# Patient Record
Sex: MALE | Race: BLACK OR AFRICAN AMERICAN | NOT HISPANIC OR LATINO | Employment: FULL TIME | URBAN - METROPOLITAN AREA
[De-identification: names, ages, dates, MRNs, and addresses within clinical notes are randomized per-mention and may not be internally consistent; named-entity substitution may affect disease eponyms.]

---

## 2023-07-24 LAB
ALANINE AMINOTRANSFERASE (SGPT) (U/L) IN SER/PLAS: 7 U/L (ref 10–52)
ALBUMIN (G/DL) IN SER/PLAS: 4.3 G/DL (ref 3.4–5)
ALKALINE PHOSPHATASE (U/L) IN SER/PLAS: 60 U/L (ref 33–120)
ANION GAP IN SER/PLAS: 13 MMOL/L (ref 10–20)
ASPARTATE AMINOTRANSFERASE (SGOT) (U/L) IN SER/PLAS: 10 U/L (ref 9–39)
BASOPHILS (10*3/UL) IN BLOOD BY AUTOMATED COUNT: 0.07 X10E9/L (ref 0–0.1)
BASOPHILS/100 LEUKOCYTES IN BLOOD BY AUTOMATED COUNT: 1.1 % (ref 0–2)
BILIRUBIN TOTAL (MG/DL) IN SER/PLAS: 0.3 MG/DL (ref 0–1.2)
CALCIUM (MG/DL) IN SER/PLAS: 9.2 MG/DL (ref 8.6–10.6)
CARBON DIOXIDE, TOTAL (MMOL/L) IN SER/PLAS: 25 MMOL/L (ref 21–32)
CHLORIDE (MMOL/L) IN SER/PLAS: 105 MMOL/L (ref 98–107)
CREATININE (MG/DL) IN SER/PLAS: 1.25 MG/DL (ref 0.5–1.3)
EOSINOPHILS (10*3/UL) IN BLOOD BY AUTOMATED COUNT: 0.24 X10E9/L (ref 0–0.7)
EOSINOPHILS/100 LEUKOCYTES IN BLOOD BY AUTOMATED COUNT: 3.6 % (ref 0–6)
ERYTHROCYTE DISTRIBUTION WIDTH (RATIO) BY AUTOMATED COUNT: 14.5 % (ref 11.5–14.5)
ERYTHROCYTE MEAN CORPUSCULAR HEMOGLOBIN CONCENTRATION (G/DL) BY AUTOMATED: 32.9 G/DL (ref 32–36)
ERYTHROCYTE MEAN CORPUSCULAR VOLUME (FL) BY AUTOMATED COUNT: 97 FL (ref 80–100)
ERYTHROCYTES (10*6/UL) IN BLOOD BY AUTOMATED COUNT: 4.34 X10E12/L (ref 4.5–5.9)
GFR MALE: 74 ML/MIN/1.73M2
GLUCOSE (MG/DL) IN SER/PLAS: 117 MG/DL (ref 74–99)
HEMATOCRIT (%) IN BLOOD BY AUTOMATED COUNT: 41.9 % (ref 41–52)
HEMOGLOBIN (G/DL) IN BLOOD: 13.8 G/DL (ref 13.5–17.5)
IMMATURE GRANULOCYTES/100 LEUKOCYTES IN BLOOD BY AUTOMATED COUNT: 0.6 % (ref 0–0.9)
LEUKOCYTES (10*3/UL) IN BLOOD BY AUTOMATED COUNT: 6.6 X10E9/L (ref 4.4–11.3)
LYMPHOCYTES (10*3/UL) IN BLOOD BY AUTOMATED COUNT: 2 X10E9/L (ref 1.2–4.8)
LYMPHOCYTES/100 LEUKOCYTES IN BLOOD BY AUTOMATED COUNT: 30.2 % (ref 13–44)
MONOCYTES (10*3/UL) IN BLOOD BY AUTOMATED COUNT: 0.47 X10E9/L (ref 0.1–1)
MONOCYTES/100 LEUKOCYTES IN BLOOD BY AUTOMATED COUNT: 7.1 % (ref 2–10)
NEUTROPHILS (10*3/UL) IN BLOOD BY AUTOMATED COUNT: 3.8 X10E9/L (ref 1.2–7.7)
NEUTROPHILS/100 LEUKOCYTES IN BLOOD BY AUTOMATED COUNT: 57.4 % (ref 40–80)
NRBC (PER 100 WBCS) BY AUTOMATED COUNT: 0 /100 WBC (ref 0–0)
PLATELETS (10*3/UL) IN BLOOD AUTOMATED COUNT: 230 X10E9/L (ref 150–450)
POTASSIUM (MMOL/L) IN SER/PLAS: 3.5 MMOL/L (ref 3.5–5.3)
PROTEIN TOTAL: 7.2 G/DL (ref 6.4–8.2)
SODIUM (MMOL/L) IN SER/PLAS: 139 MMOL/L (ref 136–145)
UREA NITROGEN (MG/DL) IN SER/PLAS: 11 MG/DL (ref 6–23)

## 2023-07-25 LAB
CD3+CD4+ ABSOLUTE: 0.58 X10E9/L (ref 0.35–2.74)
CD3+CD8+ ABSOLUTE: 0.88 X10E9/L (ref 0.08–1.49)
CD4/CD8 RATIO: 0.66 (ref 1–3.5)
CD45%: 100 %
CHLAMYDIA TRACH., AMPLIFIED: NEGATIVE
CP CD3+CD4+%: 29 % (ref 29–57)
CP CD3+CD8+%: 44 % (ref 7–31)
FMETH: ABNORMAL
FSIT1: ABNORMAL
HIV-1 RNA PCR VIRAL LOAD LOG: ABNORMAL LOG10 CPY/ML
HIV-1 RNA VIRAL LOAD: ABNORMAL COPIES/ML
N. GONORRHEA, AMPLIFIED: NEGATIVE
RPR MONITORING: REACTIVE
RPR TITER MONITORING: ABNORMAL

## 2023-11-14 ENCOUNTER — OFFICE VISIT (OUTPATIENT)
Dept: IMMUNOLOGY | Facility: CLINIC | Age: 43
End: 2023-11-14
Payer: COMMERCIAL

## 2023-11-14 VITALS
HEART RATE: 87 BPM | DIASTOLIC BLOOD PRESSURE: 74 MMHG | HEIGHT: 72 IN | TEMPERATURE: 98 F | OXYGEN SATURATION: 100 % | SYSTOLIC BLOOD PRESSURE: 112 MMHG | BODY MASS INDEX: 30.37 KG/M2 | WEIGHT: 224.2 LBS | RESPIRATION RATE: 16 BRPM

## 2023-11-14 DIAGNOSIS — R52 PAIN: ICD-10-CM

## 2023-11-14 DIAGNOSIS — B20 HIV INFECTION, UNSPECIFIED SYMPTOM STATUS (MULTI): ICD-10-CM

## 2023-11-14 LAB
ALBUMIN SERPL BCP-MCNC: 4.1 G/DL (ref 3.4–5)
ALP SERPL-CCNC: 82 U/L (ref 33–120)
ALT SERPL W P-5'-P-CCNC: 15 U/L (ref 10–52)
ANION GAP SERPL CALC-SCNC: 10 MMOL/L (ref 10–20)
AST SERPL W P-5'-P-CCNC: 12 U/L (ref 9–39)
BASOPHILS # BLD AUTO: 0.09 X10*3/UL (ref 0–0.1)
BASOPHILS NFR BLD AUTO: 1.5 %
BILIRUB SERPL-MCNC: 0.3 MG/DL (ref 0–1.2)
BUN SERPL-MCNC: 10 MG/DL (ref 6–23)
CALCIUM SERPL-MCNC: 9.3 MG/DL (ref 8.6–10.6)
CD3+CD4+ CELLS # BLD: 0.59 X10E9/L
CD3+CD4+ CELLS NFR BLD: 30 %
CD3+CD4+ CELLS/CD3+CD8+ CLL BLD: 0.64 %
CD3+CD8+ CELLS # BLD: 0.92 X10E9/L
CD3+CD8+ CELLS NFR BLD: 47 %
CHLORIDE SERPL-SCNC: 103 MMOL/L (ref 98–107)
CO2 SERPL-SCNC: 29 MMOL/L (ref 21–32)
CREAT SERPL-MCNC: 1.26 MG/DL (ref 0.5–1.3)
EOSINOPHIL # BLD AUTO: 0.28 X10*3/UL (ref 0–0.7)
EOSINOPHIL NFR BLD AUTO: 4.5 %
ERYTHROCYTE [DISTWIDTH] IN BLOOD BY AUTOMATED COUNT: 14.4 % (ref 11.5–14.5)
GFR SERPL CREATININE-BSD FRML MDRD: 73 ML/MIN/1.73M*2
GLUCOSE SERPL-MCNC: 91 MG/DL (ref 74–99)
HCT VFR BLD AUTO: 43.2 % (ref 41–52)
HGB BLD-MCNC: 14.2 G/DL (ref 13.5–17.5)
IMM GRANULOCYTES # BLD AUTO: 0.05 X10*3/UL (ref 0–0.7)
IMM GRANULOCYTES NFR BLD AUTO: 0.8 % (ref 0–0.9)
LYMPHOCYTES # BLD AUTO: 1.96 X10*3/UL (ref 1.2–4.8)
LYMPHOCYTES # SPEC AUTO: 1.96 X10*3/UL
LYMPHOCYTES NFR BLD AUTO: 31.6 %
MCH RBC QN AUTO: 31.2 PG (ref 26–34)
MCHC RBC AUTO-ENTMCNC: 32.9 G/DL (ref 32–36)
MCV RBC AUTO: 95 FL (ref 80–100)
MONOCYTES # BLD AUTO: 0.58 X10*3/UL (ref 0.1–1)
MONOCYTES NFR BLD AUTO: 9.4 %
NEUTROPHILS # BLD AUTO: 3.24 X10*3/UL (ref 1.2–7.7)
NEUTROPHILS NFR BLD AUTO: 52.2 %
NRBC BLD-RTO: 0 /100 WBCS (ref 0–0)
PLATELET # BLD AUTO: 259 X10*3/UL (ref 150–450)
POTASSIUM SERPL-SCNC: 4.2 MMOL/L (ref 3.5–5.3)
PROT SERPL-MCNC: 7.6 G/DL (ref 6.4–8.2)
RBC # BLD AUTO: 4.55 X10*6/UL (ref 4.5–5.9)
SODIUM SERPL-SCNC: 138 MMOL/L (ref 136–145)
WBC # BLD AUTO: 6.2 X10*3/UL (ref 4.4–11.3)

## 2023-11-14 PROCEDURE — 87536 HIV-1 QUANT&REVRSE TRNSCRPJ: CPT | Performed by: NURSE PRACTITIONER

## 2023-11-14 PROCEDURE — 36415 COLL VENOUS BLD VENIPUNCTURE: CPT | Performed by: NURSE PRACTITIONER

## 2023-11-14 PROCEDURE — 99214 OFFICE O/P EST MOD 30 MIN: CPT | Performed by: NURSE PRACTITIONER

## 2023-11-14 PROCEDURE — 80053 COMPREHEN METABOLIC PANEL: CPT | Performed by: NURSE PRACTITIONER

## 2023-11-14 PROCEDURE — 88184 FLOWCYTOMETRY/ TC 1 MARKER: CPT | Mod: TC | Performed by: NURSE PRACTITIONER

## 2023-11-14 PROCEDURE — 85025 COMPLETE CBC W/AUTO DIFF WBC: CPT | Performed by: NURSE PRACTITIONER

## 2023-11-14 RX ORDER — DEXTROMETHORPHAN HYDROBROMIDE, GUAIFENESIN 5; 100 MG/5ML; MG/5ML
650 LIQUID ORAL EVERY 8 HOURS PRN
Qty: 90 TABLET | Refills: 0 | Status: CANCELLED | OUTPATIENT
Start: 2023-11-14 | End: 2023-12-14

## 2023-11-14 RX ORDER — DEXTROMETHORPHAN HYDROBROMIDE, GUAIFENESIN 5; 100 MG/5ML; MG/5ML
650 LIQUID ORAL EVERY 8 HOURS PRN
Qty: 90 TABLET | Refills: 0 | Status: SHIPPED | OUTPATIENT
Start: 2023-11-14 | End: 2023-12-14

## 2023-11-14 ASSESSMENT — PAIN SCALES - GENERAL: PAINLEVEL: 0-NO PAIN

## 2023-11-14 NOTE — PROGRESS NOTES
HIV Clinic Follow-up Visit:    Greg Dia was last seen in [facility] on 7/24/23    Missed antiretroviral doses in last 72 hours? No    SUBJECTIVE: HIV positive gentleman in for routine follow up..  Needs to follow up with colorectal.  An episode of R UQ discomfort.    Review of Systems  Review of Systems   Constitutional: Negative.    HENT: Negative.     Eyes: Negative.    Respiratory:          See under Cardio -    Cardiovascular:         About a week and a half ago he had some unpleasant pain in the RUQ of his chest that lasted for about an hour and a half.  Then it was gone.  This has happened once before.  We discussed reasons to go to ER, etc.  We will do watchful waiting at this time and do an  ultrasound if it happens again.    Gastrointestinal:         He has been seen by colorectal and urology for condyloma - will follow up with them re:  scheduling the procedure(s).   Endocrine: Negative.    Genitourinary: Negative.         Being see by urology and colorectal re: condylomas.   Musculoskeletal: Negative.    Allergic/Immunologic: Negative.    Neurological: Negative.    Hematological: Negative.    Psychiatric/Behavioral: Negative.         CURRENT MEDICATIONS:    Current Outpatient Medications:     acetaminophen (Tylenol 8 Hour) 650 mg ER tablet, Take 1 tablet (650 mg) by mouth every 8 hours if needed for mild pain (1 - 3). Do not crush, chew, or split., Disp: 90 tablet, Rfl: 0    amLODIPine (Norvasc) 10 mg tablet, TAKE ONE (1) TABLET BY MOUTH ONCE DAILY, Disp: 30 tablet, Rfl: 5    ouiexxuox-zfqdmhcl-dilhkjs ala (Biktarvy) -25 mg tablet, TAKE 1 TABLET DAILY, Disp: 30 tablet, Rfl: 5    lisinopril 40 mg tablet, TAKE 1 TABLET BY MOUTH DAILY., Disp: 30 tablet, Rfl: 5    PHYSICAL EXAMINATION:  Visit Vitals  /74 (BP Location: Right arm, Patient Position: Sitting, BP Cuff Size: Large adult)   Pulse 87   Temp 36.7 °C (98 °F) (Temporal)   Resp 16   Ht 1.829 m (6')   Wt 102 kg (224 lb 3.2 oz)   SpO2 100%   BMI  30.41 kg/m²   Smoking Status Every Day   BSA 2.28 m²       Physical Exam   Physical Exam  Vitals reviewed.   Constitutional:       Appearance: Normal appearance.   HENT:      Head: Normocephalic.   Cardiovascular:      Rate and Rhythm: Normal rate and regular rhythm.      Heart sounds: Normal heart sounds.   Pulmonary:      Effort: Pulmonary effort is normal.      Breath sounds: Normal breath sounds.   Abdominal:      Palpations: Abdomen is soft.   Genitourinary:     Comments: He will follow up with colorectal/urology re: procedure.  I will also send a note.   Musculoskeletal:         General: Normal range of motion.      Cervical back: Normal range of motion and neck supple.   Neurological:      Mental Status: He is alert and oriented to person, place, and time.   Psychiatric:         Mood and Affect: Mood normal.         PERTINENT DATA:  CBC:  WBC   Date Value Ref Range Status   11/14/2023 6.2 4.4 - 11.3 x10*3/uL Final     nRBC   Date Value Ref Range Status   11/14/2023 0.0 0.0 - 0.0 /100 WBCs Final     RBC   Date Value Ref Range Status   11/14/2023 4.55 4.50 - 5.90 x10*6/uL Final     Hemoglobin   Date Value Ref Range Status   11/14/2023 14.2 13.5 - 17.5 g/dL Final     MCV   Date Value Ref Range Status   11/14/2023 95 80 - 100 fL Final     RDW   Date Value Ref Range Status   11/14/2023 14.4 11.5 - 14.5 % Final       Renal Function Panel:  Glucose   Date Value Ref Range Status   11/14/2023 91 74 - 99 mg/dL Final     Sodium   Date Value Ref Range Status   11/14/2023 138 136 - 145 mmol/L Final     Potassium   Date Value Ref Range Status   11/14/2023 4.2 3.5 - 5.3 mmol/L Final     Chloride   Date Value Ref Range Status   11/14/2023 103 98 - 107 mmol/L Final     Anion Gap   Date Value Ref Range Status   11/14/2023 10 10 - 20 mmol/L Final     Urea Nitrogen   Date Value Ref Range Status   11/14/2023 10 6 - 23 mg/dL Final     Creatinine   Date Value Ref Range Status   11/14/2023 1.26 0.50 - 1.30 mg/dL Final     eGFR  "  Date Value Ref Range Status   11/14/2023 73 >60 mL/min/1.73m*2 Final     Comment:     Calculations of estimated GFR are performed using the 2021 CKD-EPI Study Refit equation without the race variable for the IDMS-Traceable creatinine methods.  https://jasn.asnjournals.org/content/early/2021/09/22/ASN.4953242289     Calcium   Date Value Ref Range Status   11/14/2023 9.3 8.6 - 10.6 mg/dL Final     Phosphorus   Date Value Ref Range Status   01/24/2023 5.2 (H) 2.5 - 4.9 mg/dL Final     Comment:      The performance characteristics of phosphorus testing in   heparinized plasma have been validated by the individual     laboratory site where testing is performed. Testing    on heparinized plasma is not approved by the FDA;    however, such approval is not necessary.       Albumin   Date Value Ref Range Status   11/14/2023 4.1 3.4 - 5.0 g/dL Final       Hepatic Panel:  Albumin   Date Value Ref Range Status   11/14/2023 4.1 3.4 - 5.0 g/dL Final     Bilirubin, Total   Date Value Ref Range Status   11/14/2023 0.3 0.0 - 1.2 mg/dL Final     Bilirubin, Direct   Date Value Ref Range Status   01/24/2023 0.1 0.0 - 0.3 mg/dL Final     Alkaline Phosphatase   Date Value Ref Range Status   11/14/2023 82 33 - 120 U/L Final     ALT   Date Value Ref Range Status   11/14/2023 15 10 - 52 U/L Final     Comment:     Patients treated with Sulfasalazine may generate falsely decreased results for ALT.       HIV Viral Load:  No results found for: \"[id number]\", \"HIVRNAPCR\"    CD4 Count:  CD3+CD4+%   Date Value Ref Range Status   07/24/2023 29 29 - 57 % Final     CD3+CD4+ Absolute   Date Value Ref Range Status   07/24/2023 0.580 0.350 - 2.740 x10E9/L Final     CD3+CD8+%   Date Value Ref Range Status   07/24/2023 44 (H) 7 - 31 % Final     CD3+CD8+ Absolute   Date Value Ref Range Status   07/24/2023 0.880 0.080 - 1.490 x10E9/L Final     CD4/CD8 Ratio   Date Value Ref Range Status   07/24/2023 0.66 (L) 1.00 - 3.50 Final     CD45%   Date Value Ref " Range Status   07/24/2023 100 % Final       CRCL:  Creatinine, Urine   Date Value Ref Range Status   12/13/2022 180.0 20.0 - 370.0 mg/dL Final       Lipid Panel:  HDL   Date Value Ref Range Status   01/24/2023 34.9 (A) mg/dL Final     Comment:     .      AGE      VERY LOW   LOW     NORMAL    HIGH       0-19 Y       < 35   < 40     40-45     ----    20-24 Y       ----   < 40       >45     ----      >24 Y       ----   < 40     40-60      >60  .       Cholesterol/HDL Ratio   Date Value Ref Range Status   01/24/2023 4.3  Final     Comment:     REF VALUES  DESIRABLE  < 3.4  HIGH RISK  > 5.0       LDL   Date Value Ref Range Status   01/24/2023 94 0 - 99 mg/dL Final     Comment:     .                           NEAR      BORD      AGE      DESIRABLE  OPTIMAL    HIGH     HIGH     VERY HIGH     0-19 Y     0 - 109     ---    110-129   >/= 130     ----    20-24 Y     0 - 119     ---    120-159   >/= 160     ----      >24 Y     0 -  99   100-129  130-159   160-189     >/=190  .       VLDL   Date Value Ref Range Status   01/24/2023 20 0 - 40 mg/dL Final     Triglycerides   Date Value Ref Range Status   01/24/2023 99 0 - 149 mg/dL Final     Comment:     .      AGE      DESIRABLE   BORDERLINE HIGH   HIGH     VERY HIGH   0 D-90 D    19 - 174         ----         ----        ----  91 D- 9 Y     0 -  74        75 -  99     >/= 100      ----    10-19 Y     0 -  89        90 - 129     >/= 130      ----    20-24 Y     0 - 114       115 - 149     >/= 150      ----         >24 Y     0 - 149       150 - 199    200- 499    >/= 500  .   Venipuncture immediately after or during the    administration of Metamizole may lead to falsely   low results. Testing should be performed immediately   prior to Metamizole dosing.     01/24/2023 99 0 - 149 mg/dL Final     Comment:     .      AGE      DESIRABLE   BORDERLINE HIGH   HIGH     VERY HIGH   0 D-90 D    19 - 174         ----         ----        ----  91 D- 9 Y     0 -  74        75 -  99     >/= 100  "     ----    10-19 Y     0 -  89        90 - 129     >/= 130      ----    20-24 Y     0 - 114       115 - 149     >/= 150      ----         >24 Y     0 - 149       150 - 199    200- 499    >/= 500  .   Venipuncture immediately after or during the    administration of Metamizole may lead to falsely   low results. Testing should be performed immediately   prior to Metamizole dosing.         HgbA1c:  No results found for: \"HGBA1C\"    The 10-year ASCVD risk score (Denton AUGUSTINE, et al., 2019) is: 7.4%    Values used to calculate the score:      Age: 43 years      Sex: Male      Is Non- : Yes      Diabetic: No      Tobacco smoker: Yes      Systolic Blood Pressure: 112 mmHg      Is BP treated: Yes      HDL Cholesterol: 34.9 mg/dL      Total Cholesterol: 149 mg/dL    ASSESSMENT / PLAN:  Will get routine labs today; and follow up in 6 months.  Sooner if needed.  Will also reach out to colorectal on his behalf.  Also see by SW.    Problem List Items Addressed This Visit    None  Visit Diagnoses       Pain        Relevant Medications    acetaminophen (Tylenol 8 Hour) 650 mg ER tablet    HIV infection, unspecified symptom status (CMS/HCC)        Relevant Orders    CBC and Auto Differential (Completed)    CD4/8 Panel    Comprehensive Metabolic Panel (Completed)    HIV RNA, quantitative, PCR        Thank you for coming in to see us today.,   We are getting routine labs; and plan to see you again in 6 months.  We discussed that with [facility] s.r.lshubham you can contact any of  your providers directly.   I will also reach out for you regarding the colorectal procedure.    Anne Wong, APRN-CNP      "

## 2023-11-14 NOTE — LETTER
11/15/23    Portillo Villatoro MD  Office Address Unavailable  Office Address Unavailable  As Of 09/30/2020      Dear Dr. Portillo Villatoro MD,    I am writing to confirm that your patient, Javi Dia, received care in my office on 11/15/23. I have enclosed a summary of the care provided to Javi for your reference.    Please contact me with any questions you may have regarding the visit.    Sincerely,         Anne Wong, APRN-CNP  [address]  [facility] [place] [address]    CC: No Recipients

## 2023-11-14 NOTE — PROGRESS NOTES
PT lost Medicaid because he was over income. He has tried multiple times to reach to Medicaid to get a denial letter but has not been able to get through. Pt needs to provide a denial letter to employer to get employee based insurance during a non open enrollment time.  SW asked pt to find out when his open enrollment at work is and th will let SW know. Discussed that if it is in the spring we can obtain HERNAN coverage until then.   Chiquita Cox, [facility], [place]

## 2023-11-15 LAB
HIV1 RNA # PLAS NAA DL=20: NOT DETECTED {COPIES}/ML
HIV1 RNA SPEC NAA+PROBE-LOG#: NORMAL {LOG_COPIES}/ML

## 2023-11-15 ASSESSMENT — ENCOUNTER SYMPTOMS
ENDOCRINE NEGATIVE: 1
MUSCULOSKELETAL NEGATIVE: 1
HEMATOLOGIC/LYMPHATIC NEGATIVE: 1
EYES NEGATIVE: 1
CONSTITUTIONAL NEGATIVE: 1
ALLERGIC/IMMUNOLOGIC NEGATIVE: 1
PSYCHIATRIC NEGATIVE: 1
NEUROLOGICAL NEGATIVE: 1

## 2023-11-16 ENCOUNTER — TELEPHONE (OUTPATIENT)
Dept: IMMUNOLOGY | Facility: CLINIC | Age: 43
End: 2023-11-16
Payer: COMMERCIAL

## 2023-11-16 NOTE — PROGRESS NOTES
SW spoke to pt and his open enrollment at work ends next week and he will enroll in a plan today. Pt and SW discussed ANDREW and he will talk to his HR rep about this and SW let him know that they can call SW as well. PT is aware that check will have [place] [employer] on it.. PT will update JEZ Cox, JOHN AGUILA

## 2024-01-08 DIAGNOSIS — I67.1 CEREBRAL ANEURYSM (HHS-HCC): Primary | ICD-10-CM

## 2024-02-16 ENCOUNTER — TELEPHONE (OUTPATIENT)
Dept: IMMUNOLOGY | Facility: CLINIC | Age: 44
End: 2024-02-16
Payer: COMMERCIAL

## 2024-02-16 ENCOUNTER — DOCUMENTATION (OUTPATIENT)
Dept: IMMUNOLOGY | Facility: CLINIC | Age: 44
End: 2024-02-16
Payer: COMMERCIAL

## 2024-02-16 DIAGNOSIS — I10 HYPERTENSION, UNSPECIFIED TYPE: ICD-10-CM

## 2024-02-16 DIAGNOSIS — B20 HUMAN IMMUNODEFICIENCY VIRUS (MULTI): Primary | ICD-10-CM

## 2024-02-16 RX ORDER — AMLODIPINE BESYLATE 10 MG/1
10 TABLET ORAL DAILY
Qty: 30 TABLET | Refills: 3 | Status: SHIPPED | OUTPATIENT
Start: 2024-02-16 | End: 2024-03-17

## 2024-02-16 RX ORDER — LISINOPRIL 40 MG/1
40 TABLET ORAL DAILY
Qty: 30 TABLET | Refills: 3 | Status: SHIPPED | OUTPATIENT
Start: 2024-02-16 | End: 2024-03-17

## 2024-02-16 NOTE — TELEPHONE ENCOUNTER
Received call from pt, this sw covering for regular sw.  Pt not able to order meds.  [facility]  Tia worked with pharm do to [facility].  Confirmed it went through with no copay.  Sw notified pt.  Pt has no other questions for sw at this time.

## 2024-02-19 ENCOUNTER — DOCUMENTATION (OUTPATIENT)
Dept: IMMUNOLOGY | Facility: CLINIC | Age: 44
End: 2024-02-19
Payer: COMMERCIAL

## 2024-04-15 ENCOUNTER — TELEPHONE (OUTPATIENT)
Dept: IMMUNOLOGY | Facility: CLINIC | Age: 44
End: 2024-04-15
Payer: COMMERCIAL

## 2024-04-15 NOTE — TELEPHONE ENCOUNTER
Pt missed today's appointment. SW called pt and left message for him to call back to JOHN Whiting

## 2024-04-22 ENCOUNTER — OFFICE VISIT (OUTPATIENT)
Dept: IMMUNOLOGY | Facility: CLINIC | Age: 44
End: 2024-04-22
Payer: COMMERCIAL

## 2024-04-22 ENCOUNTER — DOCUMENTATION (OUTPATIENT)
Dept: IMMUNOLOGY | Facility: CLINIC | Age: 44
End: 2024-04-22
Payer: COMMERCIAL

## 2024-04-22 VITALS
RESPIRATION RATE: 16 BRPM | HEART RATE: 77 BPM | TEMPERATURE: 98.1 F | OXYGEN SATURATION: 99 % | BODY MASS INDEX: 29.42 KG/M2 | SYSTOLIC BLOOD PRESSURE: 112 MMHG | HEIGHT: 73 IN | DIASTOLIC BLOOD PRESSURE: 75 MMHG | WEIGHT: 222 LBS

## 2024-04-22 DIAGNOSIS — Z59.41 FOOD INSECURITY: ICD-10-CM

## 2024-04-22 DIAGNOSIS — B20 HIV INFECTION, UNSPECIFIED SYMPTOM STATUS (MULTI): ICD-10-CM

## 2024-04-22 DIAGNOSIS — M19.90 ARTHRITIS PAIN: Primary | ICD-10-CM

## 2024-04-22 DIAGNOSIS — M19.90 ARTHRITIS PAIN: ICD-10-CM

## 2024-04-22 LAB
ALBUMIN SERPL BCP-MCNC: 4.2 G/DL (ref 3.4–5)
ALP SERPL-CCNC: 57 U/L (ref 33–120)
ALT SERPL W P-5'-P-CCNC: 7 U/L (ref 10–52)
ANION GAP SERPL CALC-SCNC: 13 MMOL/L (ref 10–20)
AST SERPL W P-5'-P-CCNC: 11 U/L (ref 9–39)
BASOPHILS # BLD AUTO: 0.05 X10*3/UL (ref 0–0.1)
BASOPHILS NFR BLD AUTO: 0.9 %
BILIRUB SERPL-MCNC: 0.3 MG/DL (ref 0–1.2)
BUN SERPL-MCNC: 11 MG/DL (ref 6–23)
CALCIUM SERPL-MCNC: 9.5 MG/DL (ref 8.6–10.6)
CHLORIDE SERPL-SCNC: 101 MMOL/L (ref 98–107)
CO2 SERPL-SCNC: 27 MMOL/L (ref 21–32)
CREAT SERPL-MCNC: 1.5 MG/DL (ref 0.5–1.3)
EGFRCR SERPLBLD CKD-EPI 2021: 59 ML/MIN/1.73M*2
EOSINOPHIL # BLD AUTO: 0.22 X10*3/UL (ref 0–0.7)
EOSINOPHIL NFR BLD AUTO: 4.1 %
ERYTHROCYTE [DISTWIDTH] IN BLOOD BY AUTOMATED COUNT: 13.4 % (ref 11.5–14.5)
GLUCOSE SERPL-MCNC: 65 MG/DL (ref 74–99)
HCT VFR BLD AUTO: 44.6 % (ref 41–52)
HGB BLD-MCNC: 14.6 G/DL (ref 13.5–17.5)
IMM GRANULOCYTES # BLD AUTO: 0.01 X10*3/UL (ref 0–0.7)
IMM GRANULOCYTES NFR BLD AUTO: 0.2 % (ref 0–0.9)
LYMPHOCYTES # BLD AUTO: 1.77 X10*3/UL (ref 1.2–4.8)
LYMPHOCYTES NFR BLD AUTO: 33.1 %
MCH RBC QN AUTO: 31.3 PG (ref 26–34)
MCHC RBC AUTO-ENTMCNC: 32.7 G/DL (ref 32–36)
MCV RBC AUTO: 96 FL (ref 80–100)
MONOCYTES # BLD AUTO: 0.55 X10*3/UL (ref 0.1–1)
MONOCYTES NFR BLD AUTO: 10.3 %
NEUTROPHILS # BLD AUTO: 2.75 X10*3/UL (ref 1.2–7.7)
NEUTROPHILS NFR BLD AUTO: 51.4 %
NRBC BLD-RTO: 0 /100 WBCS (ref 0–0)
PLATELET # BLD AUTO: 217 X10*3/UL (ref 150–450)
POTASSIUM SERPL-SCNC: 4.4 MMOL/L (ref 3.5–5.3)
PROT SERPL-MCNC: 7.9 G/DL (ref 6.4–8.2)
RBC # BLD AUTO: 4.66 X10*6/UL (ref 4.5–5.9)
SODIUM SERPL-SCNC: 137 MMOL/L (ref 136–145)
WBC # BLD AUTO: 5.4 X10*3/UL (ref 4.4–11.3)

## 2024-04-22 PROCEDURE — 84075 ASSAY ALKALINE PHOSPHATASE: CPT | Performed by: NURSE PRACTITIONER

## 2024-04-22 PROCEDURE — 87536 HIV-1 QUANT&REVRSE TRNSCRPJ: CPT | Performed by: NURSE PRACTITIONER

## 2024-04-22 PROCEDURE — 99214 OFFICE O/P EST MOD 30 MIN: CPT | Performed by: NURSE PRACTITIONER

## 2024-04-22 PROCEDURE — 85025 COMPLETE CBC W/AUTO DIFF WBC: CPT | Performed by: NURSE PRACTITIONER

## 2024-04-22 PROCEDURE — 36415 COLL VENOUS BLD VENIPUNCTURE: CPT | Performed by: NURSE PRACTITIONER

## 2024-04-22 RX ORDER — BICTEGRAVIR SODIUM, EMTRICITABINE, AND TENOFOVIR ALAFENAMIDE FUMARATE 50; 200; 25 MG/1; MG/1; MG/1
1 TABLET ORAL DAILY
COMMUNITY
Start: 2024-03-21 | End: 2024-04-22 | Stop reason: SDUPTHER

## 2024-04-22 RX ORDER — BICTEGRAVIR SODIUM, EMTRICITABINE, AND TENOFOVIR ALAFENAMIDE FUMARATE 50; 200; 25 MG/1; MG/1; MG/1
1 TABLET ORAL DAILY
Qty: 30 TABLET | Refills: 5 | Status: SHIPPED | OUTPATIENT
Start: 2024-04-22

## 2024-04-22 RX ORDER — DEXTROMETHORPHAN HYDROBROMIDE, GUAIFENESIN 5; 100 MG/5ML; MG/5ML
650 LIQUID ORAL EVERY 8 HOURS PRN
Qty: 90 TABLET | Refills: 1 | OUTPATIENT
Start: 2024-04-22 | End: 2024-05-22

## 2024-04-22 RX ORDER — DEXTROMETHORPHAN HYDROBROMIDE, GUAIFENESIN 5; 100 MG/5ML; MG/5ML
650 LIQUID ORAL EVERY 8 HOURS PRN
Qty: 90 TABLET | Refills: 1 | Status: SHIPPED | OUTPATIENT
Start: 2024-04-22 | End: 2024-05-22

## 2024-04-22 SDOH — ECONOMIC STABILITY - FOOD INSECURITY: FOOD INSECURITY: Z59.41

## 2024-04-22 ASSESSMENT — ENCOUNTER SYMPTOMS
CARDIOVASCULAR NEGATIVE: 1
HEMATOLOGIC/LYMPHATIC NEGATIVE: 1
NEUROLOGICAL NEGATIVE: 1
ALLERGIC/IMMUNOLOGIC NEGATIVE: 1
PSYCHIATRIC NEGATIVE: 1
GASTROINTESTINAL NEGATIVE: 1
RESPIRATORY NEGATIVE: 1
EYES NEGATIVE: 1
CONSTITUTIONAL NEGATIVE: 1

## 2024-04-22 ASSESSMENT — PAIN SCALES - GENERAL: PAINLEVEL: 0-NO PAIN

## 2024-04-22 NOTE — LETTER
04/22/24    Portilol Villatoro MD  Office Address Unavailable  Office Address Unavailable  As Of 09/30/2020      Dear Dr. Portillo Villatoro MD,    I am writing to confirm that your patient, Javi Dia, received care in my office on 04/22/24. I have enclosed a summary of the care provided to Javi for your reference.    Please contact me with any questions you may have regarding the visit.    Sincerely,         Anne Wong, APRN-CNP  [address]  [facility] [address] [phone]    CC: No Recipients

## 2024-04-22 NOTE — PROGRESS NOTES
"HIV Clinic Follow-up Visit:    Greg Dia was last seen in [facility] on 4/15/2024    Missed antiretroviral doses in last 72 hours? Yes   Has missed one dose in last 3 days.     Sexually active? yes, Partner/s aware of diagnosis? yes,     Condom use? Always,   Tobacco use: Yes  Smokes about 1/2 selena a day.    No alcohol or drug  use.     SUBJECTIVE: HIV positive gentleman in for routine follow up .  No real complaints today.      Review of Systems  Review of Systems   Constitutional: Negative.    HENT: Negative.     Eyes: Negative.    Respiratory: Negative.     Cardiovascular: Negative.    Gastrointestinal: Negative.    Genitourinary: Negative.    Musculoskeletal:         Only age related aches and pains.    Skin: Negative.    Allergic/Immunologic: Negative.    Neurological: Negative.    Hematological: Negative.    Psychiatric/Behavioral: Negative.         CURRENT MEDICATIONS:    Current Outpatient Medications:     Biktarvy -25 mg tablet, Take 1 tablet by mouth once daily., Disp: , Rfl:     acetaminophen (Tylenol Arthritis Pain) 650 mg ER tablet, Take 1 tablet (650 mg) by mouth every 8 hours if needed for mild pain (1 - 3). Do not crush, chew, or split., Disp: 90 tablet, Rfl: 1    amLODIPine (Norvasc) 10 mg tablet, TAKE ONE (1) TABLET BY MOUTH ONCE DAILY, Disp: 30 tablet, Rfl: 3    lisinopril 40 mg tablet, TAKE 1 TABLET BY MOUTH DAILY., Disp: 30 tablet, Rfl: 3    PHYSICAL EXAMINATION:  Visit Vitals  /75 (BP Location: Right arm, Patient Position: Sitting, BP Cuff Size: Adult)   Pulse 77   Temp 36.7 °C (98.1 °F) (Skin)   Resp 16   Ht 1.854 m (6' 1\")   Wt 101 kg (222 lb)   SpO2 99%   BMI 29.29 kg/m²   Smoking Status Every Day   BSA 2.28 m²       Physical Exam   Physical Exam  Vitals reviewed.   Constitutional:       Appearance: Normal appearance.   HENT:      Head: Normocephalic.   Cardiovascular:      Rate and Rhythm: Normal rate and regular rhythm.      Heart sounds: Normal heart sounds.   Pulmonary:      " Effort: Pulmonary effort is normal.      Breath sounds: Normal breath sounds.   Abdominal:      General: Bowel sounds are normal.      Palpations: Abdomen is soft.   Musculoskeletal:         General: Normal range of motion.      Cervical back: Neck supple.   Skin:     General: Skin is warm and dry.   Neurological:      Mental Status: He is alert and oriented to person, place, and time.   Psychiatric:         Mood and Affect: Mood normal.         Behavior: Behavior normal.         PERTINENT DATA:  CBC:  WBC   Date Value Ref Range Status   04/22/2024 5.4 4.4 - 11.3 x10*3/uL Final     nRBC   Date Value Ref Range Status   04/22/2024 0.0 0.0 - 0.0 /100 WBCs Final     RBC   Date Value Ref Range Status   04/22/2024 4.66 4.50 - 5.90 x10*6/uL Final     Hemoglobin   Date Value Ref Range Status   04/22/2024 14.6 13.5 - 17.5 g/dL Final     MCV   Date Value Ref Range Status   04/22/2024 96 80 - 100 fL Final     RDW   Date Value Ref Range Status   04/22/2024 13.4 11.5 - 14.5 % Final       Renal Function Panel:  Glucose   Date Value Ref Range Status   11/14/2023 91 74 - 99 mg/dL Final     Sodium   Date Value Ref Range Status   11/14/2023 138 136 - 145 mmol/L Final     Potassium   Date Value Ref Range Status   11/14/2023 4.2 3.5 - 5.3 mmol/L Final     Chloride   Date Value Ref Range Status   11/14/2023 103 98 - 107 mmol/L Final     Anion Gap   Date Value Ref Range Status   11/14/2023 10 10 - 20 mmol/L Final     Urea Nitrogen   Date Value Ref Range Status   11/14/2023 10 6 - 23 mg/dL Final     Creatinine   Date Value Ref Range Status   11/14/2023 1.26 0.50 - 1.30 mg/dL Final     eGFR   Date Value Ref Range Status   11/14/2023 73 >60 mL/min/1.73m*2 Final     Comment:     Calculations of estimated GFR are performed using the 2021 CKD-EPI Study Refit equation without the race variable for the IDMS-Traceable creatinine methods.  https://jasn.asnjournals.org/content/early/2021/09/22/ASN.6436855626     Calcium   Date Value Ref Range  Status   11/14/2023 9.3 8.6 - 10.6 mg/dL Final     Phosphorus   Date Value Ref Range Status   01/24/2023 5.2 (H) 2.5 - 4.9 mg/dL Final     Comment:      The performance characteristics of phosphorus testing in   heparinized plasma have been validated by the individual     laboratory site where testing is performed. Testing    on heparinized plasma is not approved by the FDA;    however, such approval is not necessary.       Albumin   Date Value Ref Range Status   11/14/2023 4.1 3.4 - 5.0 g/dL Final       Hepatic Panel:  Albumin   Date Value Ref Range Status   11/14/2023 4.1 3.4 - 5.0 g/dL Final     Bilirubin, Total   Date Value Ref Range Status   11/14/2023 0.3 0.0 - 1.2 mg/dL Final     Bilirubin, Direct   Date Value Ref Range Status   01/24/2023 0.1 0.0 - 0.3 mg/dL Final     Alkaline Phosphatase   Date Value Ref Range Status   11/14/2023 82 33 - 120 U/L Final     ALT   Date Value Ref Range Status   11/14/2023 15 10 - 52 U/L Final     Comment:     Patients treated with Sulfasalazine may generate falsely decreased results for ALT.       HIV Viral Load:  HIV-1 RNA PCR Viral Load Log   Date Value Ref Range Status   11/14/2023   Final     Comment:     Not calculated     HIV RNA Result   Date Value Ref Range Status   11/14/2023 Not Detected Not Detected Final       CD4 Count:  CD3+CD4+%   Date Value Ref Range Status   11/14/2023 30 29 - 57 % Final     CD3+CD4+ Absolute   Date Value Ref Range Status   11/14/2023 0.588 0.350 - 2.740 x10E9/L Final     CD3+CD8+%   Date Value Ref Range Status   11/14/2023 47 (H) 7 - 31 % Final     CD3+CD8+ Absolute   Date Value Ref Range Status   11/14/2023 0.921 0.080 - 1.490 x10E9/L Final     CD4/CD8 Ratio   Date Value Ref Range Status   11/14/2023 0.64 (L) 1.00 - 2.60 Final     CD45%   Date Value Ref Range Status   07/24/2023 100 % Final       CRCL:  Creatinine, Urine   Date Value Ref Range Status   12/13/2022 180.0 20.0 - 370.0 mg/dL Final       Lipid Panel:  HDL   Date Value Ref Range  Status   01/24/2023 34.9 (A) mg/dL Final     Comment:     .      AGE      VERY LOW   LOW     NORMAL    HIGH       0-19 Y       < 35   < 40     40-45     ----    20-24 Y       ----   < 40       >45     ----      >24 Y       ----   < 40     40-60      >60  .       Cholesterol/HDL Ratio   Date Value Ref Range Status   01/24/2023 4.3  Final     Comment:     REF VALUES  DESIRABLE  < 3.4  HIGH RISK  > 5.0       LDL   Date Value Ref Range Status   01/24/2023 94 0 - 99 mg/dL Final     Comment:     .                           NEAR      BORD      AGE      DESIRABLE  OPTIMAL    HIGH     HIGH     VERY HIGH     0-19 Y     0 - 109     ---    110-129   >/= 130     ----    20-24 Y     0 - 119     ---    120-159   >/= 160     ----      >24 Y     0 -  99   100-129  130-159   160-189     >/=190  .       VLDL   Date Value Ref Range Status   01/24/2023 20 0 - 40 mg/dL Final     Triglycerides   Date Value Ref Range Status   01/24/2023 99 0 - 149 mg/dL Final     Comment:     .      AGE      DESIRABLE   BORDERLINE HIGH   HIGH     VERY HIGH   0 D-90 D    19 - 174         ----         ----        ----  91 D- 9 Y     0 -  74        75 -  99     >/= 100      ----    10-19 Y     0 -  89        90 - 129     >/= 130      ----    20-24 Y     0 - 114       115 - 149     >/= 150      ----         >24 Y     0 - 149       150 - 199    200- 499    >/= 500  .   Venipuncture immediately after or during the    administration of Metamizole may lead to falsely   low results. Testing should be performed immediately   prior to Metamizole dosing.     01/24/2023 99 0 - 149 mg/dL Final     Comment:     .      AGE      DESIRABLE   BORDERLINE HIGH   HIGH     VERY HIGH   0 D-90 D    19 - 174         ----         ----        ----  91 D- 9 Y     0 -  74        75 -  99     >/= 100      ----    10-19 Y     0 -  89        90 - 129     >/= 130      ----    20-24 Y     0 - 114       115 - 149     >/= 150      ----         >24 Y     0 - 149       150 - 199    200- 499     ">/= 500  .   Venipuncture immediately after or during the    administration of Metamizole may lead to falsely   low results. Testing should be performed immediately   prior to Metamizole dosing.         HgbA1c:  No results found for: \"HGBA1C\"    The 10-year ASCVD risk score (Denton AUGUSTINE, et al., 2019) is: 4.8%    Values used to calculate the score:      Age: 44 years      Sex: Male      Is Non- : Yes      Diabetic: No      Tobacco smoker: Yes      Systolic Blood Pressure: 112 mmHg      Is BP treated: No      HDL Cholesterol: 34.9 mg/dL      Total Cholesterol: 149 mg/dL    ASSESSMENT / PLAN:  Getting routine labs today.  Will see back in 6 months.   Made food for life referral.   Problem List Items Addressed This Visit    None  Visit Diagnoses       Arthritis pain    -  Primary    Relevant Medications    acetaminophen (Tylenol Arthritis Pain) 650 mg ER tablet    HIV infection, unspecified symptom status (Multi)        Relevant Orders    CBC and Auto Differential (Completed)    CD4/8 Panel    Comprehensive Metabolic Panel    HIV RNA, quantitative, PCR        Thank you for coming in to see us today.   You look great.  We will see you back in 6 months.     Anne Wong, APRN-CNP      "

## 2024-06-14 DIAGNOSIS — I10 HYPERTENSION, UNSPECIFIED TYPE: ICD-10-CM

## 2024-06-14 RX ORDER — LISINOPRIL 40 MG/1
40 TABLET ORAL DAILY
Qty: 30 TABLET | Refills: 3 | Status: SHIPPED | OUTPATIENT
Start: 2024-06-14

## 2024-06-14 RX ORDER — AMLODIPINE BESYLATE 10 MG/1
10 TABLET ORAL DAILY
Qty: 30 TABLET | Refills: 3 | Status: SHIPPED | OUTPATIENT
Start: 2024-06-14

## 2024-07-17 ENCOUNTER — DOCUMENTATION (OUTPATIENT)
Dept: IMMUNOLOGY | Facility: CLINIC | Age: 44
End: 2024-07-17
Payer: COMMERCIAL

## 2024-07-17 NOTE — PROGRESS NOTES
Pt dropped off pay stubs for OHDAP application. SW completed and submitted OHDAP application. SW will update when approved.   JOHN Phipps     PT approved for OHDAP. SW called pt and he will let SW know if there are any issues filling meds.   JOHN Phipps

## 2024-07-22 DIAGNOSIS — I10 HYPERTENSION, UNSPECIFIED TYPE: ICD-10-CM

## 2024-07-22 RX ORDER — AMLODIPINE BESYLATE 10 MG/1
10 TABLET ORAL DAILY
Qty: 90 TABLET | Refills: 1 | OUTPATIENT
Start: 2024-07-22

## 2024-07-22 RX ORDER — LISINOPRIL 40 MG/1
40 TABLET ORAL DAILY
Qty: 90 TABLET | Refills: 1 | OUTPATIENT
Start: 2024-07-22

## 2024-07-22 NOTE — TELEPHONE ENCOUNTER
Pharmacy sent [facility]ill [facility] for [facility]day [facility] Will [facility], patient already has scripts for 30-day supply w/refills.

## 2024-08-31 DIAGNOSIS — I10 HYPERTENSION, UNSPECIFIED TYPE: ICD-10-CM

## 2024-09-03 RX ORDER — LISINOPRIL 40 MG/1
40 TABLET ORAL DAILY
Qty: 90 TABLET | Refills: 1 | Status: SHIPPED | OUTPATIENT
Start: 2024-09-03

## 2024-09-03 RX ORDER — AMLODIPINE BESYLATE 10 MG/1
10 TABLET ORAL DAILY
Qty: 90 TABLET | Refills: 1 | Status: SHIPPED | OUTPATIENT
Start: 2024-09-03

## 2024-09-10 DIAGNOSIS — M19.90 ARTHRITIS PAIN: ICD-10-CM

## 2024-09-10 DIAGNOSIS — B20 HIV INFECTION, UNSPECIFIED SYMPTOM STATUS (MULTI): ICD-10-CM

## 2024-09-10 RX ORDER — DEXTROMETHORPHAN HYDROBROMIDE, GUAIFENESIN 5; 100 MG/5ML; MG/5ML
650 LIQUID ORAL EVERY 8 HOURS PRN
Qty: 30 TABLET | Refills: 1 | Status: SHIPPED | OUTPATIENT
Start: 2024-09-10

## 2024-10-28 ENCOUNTER — APPOINTMENT (OUTPATIENT)
Dept: IMMUNOLOGY | Facility: CLINIC | Age: 44
End: 2024-10-28
Payer: COMMERCIAL

## 2024-10-29 ENCOUNTER — SOCIAL WORK (OUTPATIENT)
Dept: IMMUNOLOGY | Facility: CLINIC | Age: 44
End: 2024-10-29

## 2024-10-29 ENCOUNTER — OFFICE VISIT (OUTPATIENT)
Dept: IMMUNOLOGY | Facility: CLINIC | Age: 44
End: 2024-10-29
Payer: COMMERCIAL

## 2024-10-29 VITALS
WEIGHT: 225.2 LBS | HEART RATE: 79 BPM | HEIGHT: 73 IN | DIASTOLIC BLOOD PRESSURE: 67 MMHG | TEMPERATURE: 97.6 F | SYSTOLIC BLOOD PRESSURE: 105 MMHG | OXYGEN SATURATION: 100 % | BODY MASS INDEX: 29.85 KG/M2 | RESPIRATION RATE: 16 BRPM

## 2024-10-29 DIAGNOSIS — Z21 HIV INFECTION, UNSPECIFIED SYMPTOM STATUS: ICD-10-CM

## 2024-10-29 DIAGNOSIS — Z59.41 FOOD INSECURITY: Primary | ICD-10-CM

## 2024-10-29 DIAGNOSIS — Z59.41 FOOD INSECURITY: ICD-10-CM

## 2024-10-29 LAB
ALBUMIN SERPL BCP-MCNC: 4.2 G/DL (ref 3.4–5)
ALP SERPL-CCNC: 51 U/L (ref 33–120)
ALT SERPL W P-5'-P-CCNC: 6 U/L (ref 10–52)
ANION GAP SERPL CALC-SCNC: 10 MMOL/L (ref 10–20)
AST SERPL W P-5'-P-CCNC: 13 U/L (ref 9–39)
BASOPHILS # BLD AUTO: 0.07 X10*3/UL (ref 0–0.1)
BASOPHILS NFR BLD AUTO: 1.1 %
BILIRUB SERPL-MCNC: 0.5 MG/DL (ref 0–1.2)
BUN SERPL-MCNC: 8 MG/DL (ref 6–23)
CALCIUM SERPL-MCNC: 8.7 MG/DL (ref 8.6–10.6)
CHLORIDE SERPL-SCNC: 100 MMOL/L (ref 98–107)
CO2 SERPL-SCNC: 31 MMOL/L (ref 21–32)
CREAT SERPL-MCNC: 1.25 MG/DL (ref 0.5–1.3)
EGFRCR SERPLBLD CKD-EPI 2021: 74 ML/MIN/1.73M*2
EOSINOPHIL # BLD AUTO: 0.18 X10*3/UL (ref 0–0.7)
EOSINOPHIL NFR BLD AUTO: 2.9 %
ERYTHROCYTE [DISTWIDTH] IN BLOOD BY AUTOMATED COUNT: 13.4 % (ref 11.5–14.5)
GLUCOSE SERPL-MCNC: 75 MG/DL (ref 74–99)
HCT VFR BLD AUTO: 39.2 % (ref 41–52)
HGB BLD-MCNC: 13 G/DL (ref 13.5–17.5)
IMM GRANULOCYTES # BLD AUTO: 0.02 X10*3/UL (ref 0–0.7)
IMM GRANULOCYTES NFR BLD AUTO: 0.3 % (ref 0–0.9)
LYMPHOCYTES # BLD AUTO: 1.68 X10*3/UL (ref 1.2–4.8)
LYMPHOCYTES NFR BLD AUTO: 26.8 %
MCH RBC QN AUTO: 31.6 PG (ref 26–34)
MCHC RBC AUTO-ENTMCNC: 33.2 G/DL (ref 32–36)
MCV RBC AUTO: 95 FL (ref 80–100)
MONOCYTES # BLD AUTO: 0.59 X10*3/UL (ref 0.1–1)
MONOCYTES NFR BLD AUTO: 9.4 %
NEUTROPHILS # BLD AUTO: 3.73 X10*3/UL (ref 1.2–7.7)
NEUTROPHILS NFR BLD AUTO: 59.5 %
NRBC BLD-RTO: 0 /100 WBCS (ref 0–0)
PLATELET # BLD AUTO: 225 X10*3/UL (ref 150–450)
POTASSIUM SERPL-SCNC: 4.4 MMOL/L (ref 3.5–5.3)
PROT SERPL-MCNC: 7.6 G/DL (ref 6.4–8.2)
RBC # BLD AUTO: 4.12 X10*6/UL (ref 4.5–5.9)
SODIUM SERPL-SCNC: 137 MMOL/L (ref 136–145)
WBC # BLD AUTO: 6.3 X10*3/UL (ref 4.4–11.3)

## 2024-10-29 PROCEDURE — 88185 FLOWCYTOMETRY/TC ADD-ON: CPT | Performed by: NURSE PRACTITIONER

## 2024-10-29 PROCEDURE — 99215 OFFICE O/P EST HI 40 MIN: CPT | Performed by: NURSE PRACTITIONER

## 2024-10-29 PROCEDURE — 85025 COMPLETE CBC W/AUTO DIFF WBC: CPT | Performed by: NURSE PRACTITIONER

## 2024-10-29 PROCEDURE — 36415 COLL VENOUS BLD VENIPUNCTURE: CPT | Performed by: NURSE PRACTITIONER

## 2024-10-29 PROCEDURE — 80053 COMPREHEN METABOLIC PANEL: CPT | Performed by: NURSE PRACTITIONER

## 2024-10-29 PROCEDURE — 3008F BODY MASS INDEX DOCD: CPT | Performed by: NURSE PRACTITIONER

## 2024-10-29 PROCEDURE — 87536 HIV-1 QUANT&REVRSE TRNSCRPJ: CPT | Performed by: NURSE PRACTITIONER

## 2024-10-29 SDOH — ECONOMIC STABILITY: FOOD INSECURITY: WITHIN THE PAST 12 MONTHS, THE FOOD YOU BOUGHT JUST DIDN'T LAST AND YOU DIDN'T HAVE MONEY TO GET MORE.: OFTEN TRUE

## 2024-10-29 SDOH — ECONOMIC STABILITY: FOOD INSECURITY: WITHIN THE PAST 12 MONTHS, YOU WORRIED THAT YOUR FOOD WOULD RUN OUT BEFORE YOU GOT MONEY TO BUY MORE.: OFTEN TRUE

## 2024-10-29 SDOH — ECONOMIC STABILITY - FOOD INSECURITY: FOOD INSECURITY: Z59.41

## 2024-10-29 ASSESSMENT — PAIN SCALES - GENERAL: PAINLEVEL_OUTOF10: 0-NO PAIN

## 2024-10-29 ASSESSMENT — ENCOUNTER SYMPTOMS
RESPIRATORY NEGATIVE: 1
EYES NEGATIVE: 1
NEUROLOGICAL NEGATIVE: 1
ENDOCRINE NEGATIVE: 1
CONSTITUTIONAL NEGATIVE: 1
GASTROINTESTINAL NEGATIVE: 1
CARDIOVASCULAR NEGATIVE: 1
HEMATOLOGIC/LYMPHATIC NEGATIVE: 1
ALLERGIC/IMMUNOLOGIC NEGATIVE: 1

## 2024-10-30 LAB
CD3+CD4+ CELLS # BLD: 0.45 X10E9/L
CD3+CD4+ CELLS # BLD: 454 /MM3
CD3+CD4+ CELLS NFR BLD: 27 %
CD3+CD4+ CELLS/CD3+CD8+ CLL BLD: 0.57 %
CD3+CD8+ CELLS # BLD: 0.79 X10E9/L
CD3+CD8+ CELLS NFR BLD: 47 %
HIV1 RNA # PLAS NAA DL=20: NOT DETECTED {COPIES}/ML
HIV1 RNA SPEC NAA+PROBE-LOG#: NORMAL {LOG_COPIES}/ML
LYMPHOCYTES # SPEC AUTO: 1.68 X10*3/UL

## 2025-01-08 DIAGNOSIS — Z21 HIV INFECTION, UNSPECIFIED SYMPTOM STATUS: ICD-10-CM

## 2025-01-09 RX ORDER — BICTEGRAVIR SODIUM, EMTRICITABINE, AND TENOFOVIR ALAFENAMIDE FUMARATE 50; 200; 25 MG/1; MG/1; MG/1
1 TABLET ORAL DAILY
Qty: 30 TABLET | Refills: 5 | Status: SHIPPED | OUTPATIENT
Start: 2025-01-09

## 2025-01-19 DIAGNOSIS — I67.1 CEREBRAL ANEURYSM (HHS-HCC): Primary | ICD-10-CM

## 2025-02-10 ENCOUNTER — DOCUMENTATION (OUTPATIENT)
Dept: IMMUNOLOGY | Facility: CLINIC | Age: 45
End: 2025-02-10
Payer: COMMERCIAL

## 2025-02-10 NOTE — PROGRESS NOTES
PT called SW unable to fill meds due to copay cost. SW had spoke to pt 3 weeks ago about updating OHDAP and pt did not provide his pay stubs needed for update. SW discussed that pt can use a copay card, discussed how this works. Tia enrolled pt in [employer] and called in copay card info to his [facility]. Cost of medication was $0.   Chiquita Cox, JOHN AGUILA

## 2025-02-18 ENCOUNTER — APPOINTMENT (OUTPATIENT)
Dept: RADIOLOGY | Facility: HOSPITAL | Age: 45
End: 2025-02-18
Payer: COMMERCIAL

## 2025-02-18 DIAGNOSIS — M19.90 ARTHRITIS PAIN: ICD-10-CM

## 2025-02-18 RX ORDER — DEXTROMETHORPHAN HYDROBROMIDE, GUAIFENESIN 5; 100 MG/5ML; MG/5ML
650 LIQUID ORAL EVERY 8 HOURS PRN
Qty: 30 TABLET | Refills: 1 | Status: SHIPPED | OUTPATIENT
Start: 2025-02-18

## 2025-03-08 ENCOUNTER — HOSPITAL ENCOUNTER (EMERGENCY)
Facility: HOSPITAL | Age: 45
Discharge: HOME | End: 2025-03-08
Payer: COMMERCIAL

## 2025-03-08 ENCOUNTER — APPOINTMENT (OUTPATIENT)
Dept: RADIOLOGY | Facility: HOSPITAL | Age: 45
End: 2025-03-08
Payer: COMMERCIAL

## 2025-03-08 VITALS
OXYGEN SATURATION: 99 % | WEIGHT: 225 LBS | SYSTOLIC BLOOD PRESSURE: 148 MMHG | RESPIRATION RATE: 16 BRPM | HEART RATE: 100 BPM | TEMPERATURE: 98.1 F | BODY MASS INDEX: 29.69 KG/M2 | DIASTOLIC BLOOD PRESSURE: 88 MMHG

## 2025-03-08 DIAGNOSIS — K08.89 PAIN, DENTAL: ICD-10-CM

## 2025-03-08 DIAGNOSIS — K12.2 ORAL ABSCESS: Primary | ICD-10-CM

## 2025-03-08 LAB
ALBUMIN SERPL BCP-MCNC: 4.5 G/DL (ref 3.4–5)
ALP SERPL-CCNC: 56 U/L (ref 33–120)
ALT SERPL W P-5'-P-CCNC: 6 U/L (ref 10–52)
ANION GAP SERPL CALC-SCNC: <7 MMOL/L (ref 10–20)
AST SERPL W P-5'-P-CCNC: 15 U/L (ref 9–39)
BASOPHILS # BLD AUTO: 0.05 X10*3/UL (ref 0–0.1)
BASOPHILS NFR BLD AUTO: 0.4 %
BILIRUB SERPL-MCNC: 0.5 MG/DL (ref 0–1.2)
BUN SERPL-MCNC: 7 MG/DL (ref 6–23)
CALCIUM SERPL-MCNC: 9.6 MG/DL (ref 8.6–10.6)
CHLORIDE SERPL-SCNC: 103 MMOL/L (ref 98–107)
CO2 SERPL-SCNC: 27 MMOL/L (ref 21–32)
CREAT SERPL-MCNC: 1.05 MG/DL (ref 0.5–1.3)
EGFRCR SERPLBLD CKD-EPI 2021: >90 ML/MIN/1.73M*2
EOSINOPHIL # BLD AUTO: 0.15 X10*3/UL (ref 0–0.7)
EOSINOPHIL NFR BLD AUTO: 1.3 %
ERYTHROCYTE [DISTWIDTH] IN BLOOD BY AUTOMATED COUNT: 13.2 % (ref 11.5–14.5)
GLUCOSE SERPL-MCNC: 105 MG/DL (ref 74–99)
HCT VFR BLD AUTO: 41 % (ref 41–52)
HGB BLD-MCNC: 14.5 G/DL (ref 13.5–17.5)
IMM GRANULOCYTES # BLD AUTO: 0.06 X10*3/UL (ref 0–0.7)
IMM GRANULOCYTES NFR BLD AUTO: 0.5 % (ref 0–0.9)
LYMPHOCYTES # BLD AUTO: 2.17 X10*3/UL (ref 1.2–4.8)
LYMPHOCYTES NFR BLD AUTO: 18.3 %
MCH RBC QN AUTO: 32.1 PG (ref 26–34)
MCHC RBC AUTO-ENTMCNC: 35.4 G/DL (ref 32–36)
MCV RBC AUTO: 91 FL (ref 80–100)
MONOCYTES # BLD AUTO: 1.06 X10*3/UL (ref 0.1–1)
MONOCYTES NFR BLD AUTO: 8.9 %
NEUTROPHILS # BLD AUTO: 8.36 X10*3/UL (ref 1.2–7.7)
NEUTROPHILS NFR BLD AUTO: 70.6 %
NRBC BLD-RTO: 0 /100 WBCS (ref 0–0)
PLATELET # BLD AUTO: 287 X10*3/UL (ref 150–450)
POTASSIUM SERPL-SCNC: 4 MMOL/L (ref 3.5–5.3)
PROT SERPL-MCNC: 9.4 G/DL (ref 6.4–8.2)
RBC # BLD AUTO: 4.52 X10*6/UL (ref 4.5–5.9)
SODIUM SERPL-SCNC: 130 MMOL/L (ref 136–145)
WBC # BLD AUTO: 11.9 X10*3/UL (ref 4.4–11.3)

## 2025-03-08 PROCEDURE — 2550000001 HC RX 255 CONTRASTS: Performed by: NURSE PRACTITIONER

## 2025-03-08 PROCEDURE — 87205 SMEAR GRAM STAIN: CPT

## 2025-03-08 PROCEDURE — 85025 COMPLETE CBC W/AUTO DIFF WBC: CPT | Performed by: NURSE PRACTITIONER

## 2025-03-08 PROCEDURE — 2500000004 HC RX 250 GENERAL PHARMACY W/ HCPCS (ALT 636 FOR OP/ED)

## 2025-03-08 PROCEDURE — 36415 COLL VENOUS BLD VENIPUNCTURE: CPT | Performed by: NURSE PRACTITIONER

## 2025-03-08 PROCEDURE — 99285 EMERGENCY DEPT VISIT HI MDM: CPT

## 2025-03-08 PROCEDURE — 42000 DRAINAGE MOUTH ROOF LESION: CPT

## 2025-03-08 PROCEDURE — 2500000004 HC RX 250 GENERAL PHARMACY W/ HCPCS (ALT 636 FOR OP/ED): Performed by: NURSE PRACTITIONER

## 2025-03-08 PROCEDURE — 80053 COMPREHEN METABOLIC PANEL: CPT | Performed by: NURSE PRACTITIONER

## 2025-03-08 PROCEDURE — 96375 TX/PRO/DX INJ NEW DRUG ADDON: CPT | Mod: 59

## 2025-03-08 PROCEDURE — 96365 THER/PROPH/DIAG IV INF INIT: CPT | Mod: 59

## 2025-03-08 PROCEDURE — 87070 CULTURE OTHR SPECIMN AEROBIC: CPT

## 2025-03-08 PROCEDURE — 99285 EMERGENCY DEPT VISIT HI MDM: CPT | Performed by: NURSE PRACTITIONER

## 2025-03-08 PROCEDURE — 70487 CT MAXILLOFACIAL W/DYE: CPT

## 2025-03-08 RX ORDER — CHLORHEXIDINE GLUCONATE ORAL RINSE 1.2 MG/ML
15 SOLUTION DENTAL 3 TIMES DAILY
Qty: 118 ML | Refills: 0 | Status: SHIPPED | OUTPATIENT
Start: 2025-03-08 | End: 2025-03-15

## 2025-03-08 RX ORDER — LIDOCAINE HYDROCHLORIDE AND EPINEPHRINE 10; 10 UG/ML; MG/ML
20 INJECTION, SOLUTION INFILTRATION; PERINEURAL ONCE
Status: COMPLETED | OUTPATIENT
Start: 2025-03-08 | End: 2025-03-08

## 2025-03-08 RX ORDER — KETOROLAC TROMETHAMINE 15 MG/ML
15 INJECTION, SOLUTION INTRAMUSCULAR; INTRAVENOUS ONCE
Status: COMPLETED | OUTPATIENT
Start: 2025-03-08 | End: 2025-03-08

## 2025-03-08 RX ORDER — HYDROMORPHONE HYDROCHLORIDE 1 MG/ML
1 INJECTION, SOLUTION INTRAMUSCULAR; INTRAVENOUS; SUBCUTANEOUS
Status: DISCONTINUED | OUTPATIENT
Start: 2025-03-08 | End: 2025-03-08 | Stop reason: HOSPADM

## 2025-03-08 RX ORDER — AMOXICILLIN AND CLAVULANATE POTASSIUM 875; 125 MG/1; MG/1
1 TABLET, FILM COATED ORAL EVERY 12 HOURS
Qty: 14 TABLET | Refills: 0 | Status: SHIPPED | OUTPATIENT
Start: 2025-03-08 | End: 2025-03-15

## 2025-03-08 RX ORDER — IBUPROFEN 600 MG/1
600 TABLET ORAL EVERY 6 HOURS PRN
Qty: 15 TABLET | Refills: 0 | Status: SHIPPED | OUTPATIENT
Start: 2025-03-08 | End: 2025-03-15

## 2025-03-08 RX ORDER — ACETAMINOPHEN 325 MG/1
650 TABLET ORAL EVERY 6 HOURS PRN
Qty: 30 TABLET | Refills: 0 | Status: SHIPPED | OUTPATIENT
Start: 2025-03-08

## 2025-03-08 RX ADMIN — IOHEXOL 90 ML: 350 INJECTION, SOLUTION INTRAVENOUS at 16:54

## 2025-03-08 RX ADMIN — KETOROLAC TROMETHAMINE 15 MG: 15 INJECTION, SOLUTION INTRAMUSCULAR; INTRAVENOUS at 15:26

## 2025-03-08 RX ADMIN — AMPICILLIN SODIUM AND SULBACTAM SODIUM 3 G: 2; 1 INJECTION, POWDER, FOR SOLUTION INTRAMUSCULAR; INTRAVENOUS at 17:41

## 2025-03-08 RX ADMIN — LIDOCAINE HYDROCHLORIDE,EPINEPHRINE BITARTRATE 20 ML: 10; .01 INJECTION, SOLUTION INFILTRATION; PERINEURAL at 18:00

## 2025-03-08 ASSESSMENT — PAIN DESCRIPTION - DESCRIPTORS: DESCRIPTORS: ACHING

## 2025-03-08 ASSESSMENT — COLUMBIA-SUICIDE SEVERITY RATING SCALE - C-SSRS
6. HAVE YOU EVER DONE ANYTHING, STARTED TO DO ANYTHING, OR PREPARED TO DO ANYTHING TO END YOUR LIFE?: NO
1. IN THE PAST MONTH, HAVE YOU WISHED YOU WERE DEAD OR WISHED YOU COULD GO TO SLEEP AND NOT WAKE UP?: NO
2. HAVE YOU ACTUALLY HAD ANY THOUGHTS OF KILLING YOURSELF?: NO

## 2025-03-08 ASSESSMENT — ENCOUNTER SYMPTOMS
CARDIOVASCULAR NEGATIVE: 1
RESPIRATORY NEGATIVE: 1
CONSTITUTIONAL NEGATIVE: 1
MUSCULOSKELETAL NEGATIVE: 1
GASTROINTESTINAL NEGATIVE: 1
ENDOCRINE NEGATIVE: 1
EYES NEGATIVE: 1

## 2025-03-08 ASSESSMENT — PAIN SCALES - GENERAL: PAINLEVEL_OUTOF10: 8

## 2025-03-08 ASSESSMENT — PAIN - FUNCTIONAL ASSESSMENT: PAIN_FUNCTIONAL_ASSESSMENT: 0-10

## 2025-03-08 ASSESSMENT — PAIN DESCRIPTION - LOCATION: LOCATION: MOUTH

## 2025-03-08 NOTE — ED TRIAGE NOTES
Patient states that he has pain in his mouth.  He says that he feels like he has an abscess and infected tooth.  The top right side of his mouth is painful.  He states that this is causing him difficulty with sleep, and he finds it hard to eat as a result.  Here seeking treatment and relief from the pain.

## 2025-03-08 NOTE — CONSULTS
Consults    Reason For Consult  OMFS was consulted for further evaluation of a midline palatal abscess.    History Of Present Illness  Javi Dia is a 44 y.o. male  with PMH significant for HTN and HIV presenting with a palatal abscess. Patient states that he first noticed the presence of palatal swelling on 3/6 AM. He never noticed such a swelling in this location before. He denies any difficulty breathing, but endorses odynophagia and dysphagia. He states that it is even painful to get rid of his own secretions because of the palatal abscess.      Past Medical History  He has a past medical history of Gino gangrene (Multi) and Personal history of other diseases of the respiratory system.    Surgical History  He has a past surgical history that includes MR angio head wo IV contrast (7/7/2023) and MR angio neck wo IV contrast (7/7/2023).     Social History  He reports that he has been smoking cigarettes. He has never used smokeless tobacco. He reports that he does not currently use alcohol. He reports that he does not use drugs.    Family History  No family history on file.     Allergies  Patient has no known allergies.    Review of Systems   Constitutional: Negative.    HENT:  Positive for dental problem.    Eyes: Negative.    Respiratory: Negative.     Cardiovascular: Negative.    Gastrointestinal: Negative.    Endocrine: Negative.    Genitourinary: Negative.    Musculoskeletal: Negative.    Skin: Negative.         Physical Exam  Constitutional:       Appearance: Normal appearance.   HENT:      Head:      Comments: No gross facial asymmetries noted  CN V and VII intact and equal b/l  JACKIE is about 40 mm      Mouth/Throat:      Comments: Grossly carious teeth #2,3,6-13  1.5x1.5cm fluctuant abscess palatal to teeth #7,8,9,10 which is TTP  Cardiovascular:      Comments: Warm and well-perfused  Pulmonary:      Comments: Breathing comfortably on RA  Musculoskeletal:         General: Normal range of motion.       Cervical back: Normal range of motion and neck supple.   Neurological:      General: No focal deficit present.      Mental Status: He is alert and oriented to person, place, and time.   Psychiatric:         Mood and Affect: Mood normal.         Behavior: Behavior normal.          Last Recorded Vitals  Blood pressure 148/88, pulse 100, temperature 36.7 °C (98.1 °F), temperature source Temporal, resp. rate 16, weight 102 kg (225 lb), SpO2 99%.    Relevant Results  CT facial bones w IV contrast     IMPRESSION:  Significant odontogenic disease.      There is a large lucency involving the right maxillary central  lateral incisor with breech of the buccal and lingual cortex. There is a rounded fluid density structure within the lucency measuring 1.5 cm. This is adjacent to or in continuity to a fluid collection along the roof of the mouth measuring 2.2 cm. No osseous erosion is noted adjacent to the fluid collection within the roof of the mouth.      Mild mucosal thickening within the right maxillary sinus.      Nonspecific thickening of the nasopharyngeal soft tissues is likely reactive. Borderline cervical lymph nodes are likely reactive in the setting of infection and or inflammation.    Assessment/Plan     Javi Dia is a 44 y.o. male  with PMH significant for HTN and HIV presenting with a palatal abscess. Minimal leukocytosis and patient is afebrile. After review of CT scan that demonstrates  fluid collection palatal to maxillary anterior teeth and presence of a fluctuant swelling palatal to teeth, decision was made to perform incision and drainage bedside in the ED. M    Procedure:    Risks, benefits alternatives of incision and drainage discussed with patient, including but not limited to pain, bleeding, swelling, and possible nerve damage. Verbal consent obtained. Anesthetized patient with 15 cc of 1% lidocaine with 1:100,000 epinephrine. Stab incision was made at most fluctuant part of palatal abscess using a 15  blade through the periosteum. Blunt dissection performed, manual pressure applied at the site of swelling. Very minimal purulence noted mixed with blood.  Culture obtained and sent. Blunt dissection continued. Area of infection thoroughly drained with digital pressure. Area was then copiously irrigated with normal saline. No additional purulence noted. Post-operative instructions given. Patient tolerated the procedure well.    Recs:  Peridex (Chlorhexidine 0.12%) Rinse BID  Pain management per ED  Augmentin 875/125 BID for 7 days  Resume oral hygiene beginning tomorrow  Follow up with OMFS for multiple teeth extractions (teeth #2,3,6-11) as patient is interested in getting dentures afterwards.    If you have any questions or concerns please contact us during regular office hours ([phone]). The clinic is located within the [facility], ask the  for the Oral & Maxillofacial Surgery department ([address] [id number]). For any emergency or after hours questions do not hesitate to contact the resident on call. You may call [phone] for the hospital  and ask for the “Oral Surgeon On Call”.    Leigh Vieira DDS  OMFS PGY-1  Pager: [phone]

## 2025-03-08 NOTE — ED PROVIDER NOTES
"Emergency Department Encounter  [facility] EMERGENCY MEDICINE    Patient: Greg Dia  MRN: [id number]  : 1980  Date of Evaluation: 3/8/2025  ED Provider: ROSEANNE Farfan      Chief Complaint       Chief Complaint   Patient presents with    Dental Pain        Limitations to History: none  Historian: patient  Records reviewed: EMR inpatient and outpatient notes, Care Everywhere    This is a 44-year-old male with no significant PMH who presents to the emergency room for concern of a \"infected tooth.\"  Patient states that he is supposed to have all his teeth removed and dentures placed.  Patient states that he was having difficulty getting all his teeth removed due to financial reasons.  Patient states on Thursday he felt an abscess on the roof of his mouth.  Patient states that it is increased in size and he is having difficulty eating and swallowing due to the abscess.  Patient endorses a headache, right sided dental pain and difficulty spitting out the secretions due to the abscess.  Denies any shortness of breath.  Patient currently rates his pain a 7 out of 10.  Denies taking any pain medications prior to arrival.    PMH: Denies  PSH: Denies  Allergies: NKDA  Social HX: + Smoker, denies alcohol or drug use.  Family HX: No family history pertinent to current presenting problem  Medications: Reviewed per EMR    ROS:     Review of Systems   HENT:  Positive for dental problem.      14 systems reviewed and otherwise acutely negative except as in the [place].        Past History     Past Medical History:   Diagnosis Date    Gino gangrene (Multi)     Gino gangrene    Personal history of other diseases of the respiratory system     History of asthma     Past Surgical History:   Procedure Laterality Date    MR HEAD ANGIO WO IV CONTRAST  2023    MR HEAD ANGIO WO IV CONTRAST 2023 [facility]    MR NECK ANGIO WO IV CONTRAST  2023    MR NECK ANGIO WO IV CONTRAST 2023 [facility] MRI "         Medications/Allergies     Previous Medications    ACETAMINOPHEN (TYLENOL 8 HOUR) 650 MG ER TABLET    Take 1 tablet (650 mg) by mouth every 8 hours if needed for mild pain (1 - 3). Do not crush, chew, or split.    AMLODIPINE (NORVASC) 10 MG TABLET    TAKE 1 TABLET BY MOUTH EVERY DAY    BIKTARVY -25 MG TABLET    TAKE 1 TABLET BY MOUTH EVERY DAY    LISINOPRIL 40 MG TABLET    TAKE 1 TABLET BY MOUTH EVERY DAY     No Known Allergies     Physical Exam       ED Triage Vitals [03/08/25 1506]   Temperature Heart Rate Respirations BP   36.7 °C (98.1 °F) 100 16 148/88      Pulse Ox Temp Source Heart Rate Source Patient Position   99 % Temporal -- --      BP Location FiO2 (%)     -- --       Physical Exam:    Appearance: Alert, oriented , cooperative,  in no acute distress. Well nourished & well hydrated.    Skin: Intact,  dry skin, no lesions, rash, petechiae or purpura.     ENT: Hearing grossly intact. External auditory canals patent, tympanic membranes intact with visible landmarks. Nares patent, mucus membranes moist. Pharynx clear, uvula midline. Abscess to the roof of the mouth.    Neck: Supple, without meningismus.     Pulmonary: Clear bilaterally with good chest wall excursion. No rales, rhonchi or wheezing. No accessory muscle use or stridor.    Cardiac: Normal S1, S2 without murmur, rub, gallop or extrasystole. No JVD, Carotids without bruits.    Abdomen: Soft, nontender, active bowel sounds.  No palpable organomegaly.  No rebound or guarding.  No CVA tenderness.    Musculoskeletal: Full range of motion. no pain, edema, or deformity. Pulses full and equal. No cyanosis, clubbing, or edema.    Neurological:  Normal sensation, no weakness, no focal findings identified.    Psychiatric: Appropriate mood and affect.       Diagnostics   Labs:  Results for orders placed or performed during the hospital encounter of 03/08/25 (from the past 24 hours)   CBC and Auto Differential   Result Value Ref Range    WBC 11.9  (H) 4.4 - 11.3 x10*3/uL    nRBC 0.0 0.0 - 0.0 /100 WBCs    RBC 4.52 4.50 - 5.90 x10*6/uL    Hemoglobin 14.5 13.5 - 17.5 g/dL    Hematocrit 41.0 41.0 - 52.0 %    MCV 91 80 - 100 fL    MCH 32.1 26.0 - 34.0 pg    MCHC 35.4 32.0 - 36.0 g/dL    RDW 13.2 11.5 - 14.5 %    Platelets 287 150 - 450 x10*3/uL    Neutrophils % 70.6 40.0 - 80.0 %    Immature Granulocytes %, Automated 0.5 0.0 - 0.9 %    Lymphocytes % 18.3 13.0 - 44.0 %    Monocytes % 8.9 2.0 - 10.0 %    Eosinophils % 1.3 0.0 - 6.0 %    Basophils % 0.4 0.0 - 2.0 %    Neutrophils Absolute 8.36 (H) 1.20 - 7.70 x10*3/uL    Immature Granulocytes Absolute, Automated 0.06 0.00 - 0.70 x10*3/uL    Lymphocytes Absolute 2.17 1.20 - 4.80 x10*3/uL    Monocytes Absolute 1.06 (H) 0.10 - 1.00 x10*3/uL    Eosinophils Absolute 0.15 0.00 - 0.70 x10*3/uL    Basophils Absolute 0.05 0.00 - 0.10 x10*3/uL   Comprehensive metabolic panel   Result Value Ref Range    Glucose 105 (H) 74 - 99 mg/dL    Sodium 130 (L) 136 - 145 mmol/L    Potassium 4.0 3.5 - 5.3 mmol/L    Chloride 103 98 - 107 mmol/L    Bicarbonate 27 21 - 32 mmol/L    Anion Gap <7 (L) 10 - 20 mmol/L    Urea Nitrogen 7 6 - 23 mg/dL    Creatinine 1.05 0.50 - 1.30 mg/dL    eGFR >90 >60 mL/min/1.73m*2    Calcium 9.6 8.6 - 10.6 mg/dL    Albumin 4.5 3.4 - 5.0 g/dL    Alkaline Phosphatase 56 33 - 120 U/L    Total Protein 9.4 (H) 6.4 - 8.2 g/dL    AST 15 9 - 39 U/L    Bilirubin, Total 0.5 0.0 - 1.2 mg/dL    ALT 6 (L) 10 - 52 U/L      Radiographs:  CT facial bones w IV contrast   Final Result   Significant odontogenic disease.        There is a large lucency involving the right maxillary central   lateral incisor with breech of the buccal and lingual cortex. There   is a rounded fluid density structure within the lucency measuring 1.5   cm. This is adjacent to or in continuity to a fluid collection along   the roof of the mouth measuring 2.2 cm. No osseous erosion is noted   adjacent to the fluid collection within the roof of the mouth.         Mild mucosal thickening within the right maxillary sinus.        Nonspecific thickening of the nasopharyngeal soft tissues is likely   reactive. Borderline cervical lymph nodes are likely reactive in the   setting of infection and or inflammation.        MACRO:   None        Signed by: Joyce Feliciano 3/8/2025 5:23 PM   Dictation workstation:   [id number]              Assessment   In brief, Greg Dia is a 44 y.o. male who presented to the emergency department with an abscess on the roof of his mouth.          ED Course/MDM     Diagnoses as of 03/08/25 1825   Oral abscess   Pain, dental      Visit Vitals  /88   Pulse 100   Temp 36.7 °C (98.1 °F) (Temporal)   Resp 16   Wt 102 kg (225 lb)   SpO2 99%   BMI 29.69 kg/m²   Smoking Status Every Day   BSA 2.29 m²       Medications   HYDROmorphone (Dilaudid) injection 1 mg (has no administration in time range)   ketorolac (Toradol) injection 15 mg (15 mg intravenous Given 3/8/25 1526)   iohexol (OMNIPaque) 350 mg iodine/mL solution 90 mL (90 mL intravenous Given 3/8/25 1654)   ampicillin-sulbactam (Unasyn) 3 g in sodium chloride 0.9%  mL (0 g intravenous Stopped 3/8/25 1819)   lidocaine-epinephrine (Xylocaine W/EPI) 1 %-1:100,000 injection 20 mL (20 mL infiltration Given 3/8/25 1800)       Patient remained stable while in the emergency department. Previous outpatient and ED records were reviewed. Outside records were reviewed.  Patient received Toradol 15 mg IV for pain and Unasyn 3 g IV.  IV established and labs obtained.  CBC with a mildly elevated white blood cell count of 11.9.  Comprehensive metabolic panel with a sodium of 130, otherwise unremarkable.  CT facial bones with IV contrast was obtained.  Large lucency involving the right maxillary central lateral incisor with breach of the buccal and lingual cortex.  There is a rounded fluid density structure within the lucency measuring 1.5 cm.  This is adjacent to or in continuity to it a to a fluid  collection along the roof of the mouth measuring 2.2 cm.  OMFS team was consulted, please see OMFS consult note for complete details.  They did perform a bedside I&D.  Patient did feel better while in the emergency room.  OMFS team will call the patient on Monday to schedule an outpatient appointment to have his teeth extracted.  Patient was discharged home with prescriptions for Tylenol, ibuprofen, Augmentin and Peridex mouthwash.  Patient was advised to return the emergency room with worsening symptoms.    Final Impression      1. Oral abscess    2. Pain, dental          DISPOSITION  Disposition: Discharged home    Comment: Please note this report has been produced using speech recognition software and may contain errors related to that system including errors in grammar, punctuation, and spelling, as well as words and phrases that may be inappropriate.  If there are any questions or concerns please feel free to contact the dictating provider for clarification.    Rosaline Jimenez, ALANIS-ALANIS Fay-MARY  03/08/25 [id number]

## 2025-03-09 LAB
BACTERIA SPEC CULT: ABNORMAL
GRAM STN SPEC: ABNORMAL
GRAM STN SPEC: ABNORMAL

## 2025-03-10 LAB
B-LACTAMASE ORGANISM ISLT: NEGATIVE
BACTERIA SPEC CULT: ABNORMAL
BACTERIA SPEC CULT: ABNORMAL
GRAM STN SPEC: ABNORMAL
GRAM STN SPEC: ABNORMAL

## 2025-03-11 ENCOUNTER — TELEPHONE (OUTPATIENT)
Dept: PHARMACY | Facility: HOSPITAL | Age: 45
End: 2025-03-11
Payer: COMMERCIAL

## 2025-03-11 NOTE — PROGRESS NOTES
EDPD Note: Antibiotics Reviewed and Warranted    Contacted Mr./Mrs./Ms. Greg Dia regarding a positive wound culture/result that was taken during their recent emergency room visit. I completed education with patient . The patient is being treated appropriately with Augmentin. Spoke with patient who stated that they are feeling good. No changes in wound color, size, shape, temperature, or denies an new prulent discharge. Patient made aware that if they experience any signs/symptoms of a worsening infection to go to ED for further evaluation. Made aware to follow up with PCP.      Susceptibility data from last 90 days.  Collected Specimen Info Organism   03/08/25 Tissue/Biopsy from Mouth Mixed Microorganisms Resembling Upper Respiratory Nany      Mixed Anaerobic Bacteria        No further follow up needed from EDPD Team.     Sally Hay, PharmD

## 2025-04-05 DIAGNOSIS — I10 HYPERTENSION, UNSPECIFIED TYPE: ICD-10-CM

## 2025-04-06 DIAGNOSIS — I10 HYPERTENSION, UNSPECIFIED TYPE: ICD-10-CM

## 2025-04-07 RX ORDER — LISINOPRIL 40 MG/1
40 TABLET ORAL DAILY
Qty: 90 TABLET | Refills: 1 | Status: SHIPPED | OUTPATIENT
Start: 2025-04-07

## 2025-04-07 RX ORDER — AMLODIPINE BESYLATE 10 MG/1
10 TABLET ORAL DAILY
Qty: 90 TABLET | Refills: 1 | Status: SHIPPED | OUTPATIENT
Start: 2025-04-07

## 2025-06-23 ENCOUNTER — SOCIAL WORK (OUTPATIENT)
Dept: IMMUNOLOGY | Facility: CLINIC | Age: 45
End: 2025-06-23
Payer: COMMERCIAL

## 2025-06-23 ENCOUNTER — OFFICE VISIT (OUTPATIENT)
Dept: IMMUNOLOGY | Facility: CLINIC | Age: 45
End: 2025-06-23
Payer: COMMERCIAL

## 2025-06-23 VITALS
WEIGHT: 237.4 LBS | HEART RATE: 86 BPM | OXYGEN SATURATION: 98 % | DIASTOLIC BLOOD PRESSURE: 83 MMHG | BODY MASS INDEX: 31.32 KG/M2 | SYSTOLIC BLOOD PRESSURE: 126 MMHG

## 2025-06-23 DIAGNOSIS — H53.8 BLURRY VISION, BILATERAL: ICD-10-CM

## 2025-06-23 DIAGNOSIS — R52 BODY ACHES: ICD-10-CM

## 2025-06-23 DIAGNOSIS — Z21 HIV INFECTION, UNSPECIFIED SYMPTOM STATUS: ICD-10-CM

## 2025-06-23 DIAGNOSIS — Z59.41 FOOD INSECURITY: Primary | ICD-10-CM

## 2025-06-23 LAB
ALBUMIN SERPL BCP-MCNC: 4.5 G/DL (ref 3.4–5)
ALP SERPL-CCNC: 52 U/L (ref 33–120)
ALT SERPL W P-5'-P-CCNC: 12 U/L (ref 10–52)
ANION GAP SERPL CALC-SCNC: 11 MMOL/L (ref 10–20)
AST SERPL W P-5'-P-CCNC: 15 U/L (ref 9–39)
BASOPHILS # BLD AUTO: 0.06 X10*3/UL (ref 0–0.1)
BASOPHILS NFR BLD AUTO: 0.9 %
BILIRUB SERPL-MCNC: 0.6 MG/DL (ref 0–1.2)
BUN SERPL-MCNC: 7 MG/DL (ref 6–23)
CALCIUM SERPL-MCNC: 9.3 MG/DL (ref 8.6–10.6)
CHLORIDE SERPL-SCNC: 99 MMOL/L (ref 98–107)
CO2 SERPL-SCNC: 29 MMOL/L (ref 21–32)
CREAT SERPL-MCNC: 1.25 MG/DL (ref 0.5–1.3)
EGFRCR SERPLBLD CKD-EPI 2021: 73 ML/MIN/1.73M*2
EOSINOPHIL # BLD AUTO: 0.17 X10*3/UL (ref 0–0.7)
EOSINOPHIL NFR BLD AUTO: 2.6 %
ERYTHROCYTE [DISTWIDTH] IN BLOOD BY AUTOMATED COUNT: 14.1 % (ref 11.5–14.5)
GLUCOSE SERPL-MCNC: 92 MG/DL (ref 74–99)
HCT VFR BLD AUTO: 47.3 % (ref 41–52)
HGB BLD-MCNC: 14.9 G/DL (ref 13.5–17.5)
IMM GRANULOCYTES # BLD AUTO: 0.05 X10*3/UL (ref 0–0.7)
IMM GRANULOCYTES NFR BLD AUTO: 0.8 % (ref 0–0.9)
LYMPHOCYTES # BLD AUTO: 2.4 X10*3/UL (ref 1.2–4.8)
LYMPHOCYTES NFR BLD AUTO: 37 %
MCH RBC QN AUTO: 30.8 PG (ref 26–34)
MCHC RBC AUTO-ENTMCNC: 31.5 G/DL (ref 32–36)
MCV RBC AUTO: 98 FL (ref 80–100)
MONOCYTES # BLD AUTO: 0.59 X10*3/UL (ref 0.1–1)
MONOCYTES NFR BLD AUTO: 9.1 %
NEUTROPHILS # BLD AUTO: 3.22 X10*3/UL (ref 1.2–7.7)
NEUTROPHILS NFR BLD AUTO: 49.6 %
NRBC BLD-RTO: 0 /100 WBCS (ref 0–0)
PLATELET # BLD AUTO: 272 X10*3/UL (ref 150–450)
POTASSIUM SERPL-SCNC: 4.1 MMOL/L (ref 3.5–5.3)
PROT SERPL-MCNC: 7.9 G/DL (ref 6.4–8.2)
RBC # BLD AUTO: 4.83 X10*6/UL (ref 4.5–5.9)
SODIUM SERPL-SCNC: 135 MMOL/L (ref 136–145)
WBC # BLD AUTO: 6.5 X10*3/UL (ref 4.4–11.3)

## 2025-06-23 PROCEDURE — 87536 HIV-1 QUANT&REVRSE TRNSCRPJ: CPT | Performed by: NURSE PRACTITIONER

## 2025-06-23 PROCEDURE — 86318 IA INFECTIOUS AGENT ANTIBODY: CPT | Performed by: NURSE PRACTITIONER

## 2025-06-23 PROCEDURE — 99214 OFFICE O/P EST MOD 30 MIN: CPT | Performed by: NURSE PRACTITIONER

## 2025-06-23 PROCEDURE — 80053 COMPREHEN METABOLIC PANEL: CPT | Performed by: NURSE PRACTITIONER

## 2025-06-23 PROCEDURE — 88184 FLOWCYTOMETRY/ TC 1 MARKER: CPT | Performed by: NURSE PRACTITIONER

## 2025-06-23 PROCEDURE — 85025 COMPLETE CBC W/AUTO DIFF WBC: CPT | Performed by: NURSE PRACTITIONER

## 2025-06-23 PROCEDURE — 87591 N.GONORRHOEAE DNA AMP PROB: CPT | Performed by: NURSE PRACTITIONER

## 2025-06-23 PROCEDURE — 36415 COLL VENOUS BLD VENIPUNCTURE: CPT | Performed by: NURSE PRACTITIONER

## 2025-06-23 RX ORDER — BICTEGRAVIR SODIUM, EMTRICITABINE, AND TENOFOVIR ALAFENAMIDE FUMARATE 50; 200; 25 MG/1; MG/1; MG/1
1 TABLET ORAL DAILY
Qty: 30 TABLET | Refills: 5 | Status: SHIPPED | OUTPATIENT
Start: 2025-06-23 | End: 2025-07-23

## 2025-06-23 RX ORDER — DEXTROMETHORPHAN HYDROBROMIDE, GUAIFENESIN 5; 100 MG/5ML; MG/5ML
650 LIQUID ORAL EVERY 8 HOURS PRN
Qty: 90 TABLET | Refills: 1 | Status: SHIPPED | OUTPATIENT
Start: 2025-06-23 | End: 2025-07-23

## 2025-06-23 SDOH — ECONOMIC STABILITY - FOOD INSECURITY: FOOD INSECURITY: Z59.41

## 2025-06-23 ASSESSMENT — ENCOUNTER SYMPTOMS
RESPIRATORY NEGATIVE: 1
CONSTITUTIONAL NEGATIVE: 1
HEMATOLOGIC/LYMPHATIC NEGATIVE: 1
CARDIOVASCULAR NEGATIVE: 1
ALLERGIC/IMMUNOLOGIC NEGATIVE: 1
MUSCULOSKELETAL NEGATIVE: 1
ENDOCRINE NEGATIVE: 1
NEUROLOGICAL NEGATIVE: 1

## 2025-06-23 ASSESSMENT — PAIN SCALES - GENERAL: PAINLEVEL_OUTOF10: 0-NO PAIN

## 2025-06-23 NOTE — LETTER
06/23/25    Portillo Villatoro MD  [address] [place] [id number]      Dear Dr. Portillo Villatoro MD,    I am writing to confirm that your patient, Javi Dia, received care in my office on 06/23/25. I have enclosed a summary of the care provided to Javi for your reference.    Please contact me with any questions you may have regarding the visit.    Sincerely,         Anne Wong, APRN-CNP  [address] [facility] [address]  [facility] [id number] [phone]    CC: No Recipients

## 2025-06-23 NOTE — PROGRESS NOTES
SW met with pt for transportation assistance, [employer] provided and log signed.  When asked, pt states that there are no other concerns or worries and had no questions.
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
## 2025-06-24 LAB
C TRACH RRNA SPEC QL NAA+PROBE: NEGATIVE
CD3+CD4+ CELLS # BLD: 0.67 X10E9/L (ref 0.35–2.74)
CD3+CD4+ CELLS # BLD: 672 /MM3 (ref 350–2740)
CD3+CD4+ CELLS NFR BLD: 28 % (ref 29–57)
CD3+CD4+ CELLS/CD3+CD8+ CLL BLD: 0.6 % (ref 1–3.5)
CD3+CD8+ CELLS # BLD: 1.13 X10E9/L (ref 0.08–1.49)
CD3+CD8+ CELLS NFR BLD: 47 % (ref 7–31)
HIV1 RNA # PLAS NAA DL=20: ABNORMAL {COPIES}/ML
HIV1 RNA SPEC NAA+PROBE-LOG#: ABNORMAL {LOG_COPIES}/ML
LYMPHOCYTES # SPEC AUTO: 2.4 X10*3/UL
N GONORRHOEA DNA SPEC QL PROBE+SIG AMP: NEGATIVE
RPR SER QL: REACTIVE
RPR SER-TITR: ABNORMAL {TITER}

## 2025-06-26 ENCOUNTER — APPOINTMENT (OUTPATIENT)
Dept: IMMUNOLOGY | Facility: CLINIC | Age: 45
End: 2025-06-26
Payer: COMMERCIAL

## 2025-07-11 ENCOUNTER — DOCUMENTATION (OUTPATIENT)
Dept: IMMUNOLOGY | Facility: CLINIC | Age: 45
End: 2025-07-11
Payer: COMMERCIAL

## 2025-07-11 NOTE — PROGRESS NOTES
Pt called to remind needs Bicillin injections x3 for an RPR of 1:64. Multiple calls made to pt recently, HIPAA compliant messages left.

## 2025-07-14 ENCOUNTER — CLINICAL SUPPORT (OUTPATIENT)
Dept: IMMUNOLOGY | Facility: CLINIC | Age: 45
End: 2025-07-14
Payer: COMMERCIAL

## 2025-07-14 ENCOUNTER — DOCUMENTATION (OUTPATIENT)
Dept: IMMUNOLOGY | Facility: CLINIC | Age: 45
End: 2025-07-14
Payer: COMMERCIAL

## 2025-07-14 VITALS
OXYGEN SATURATION: 96 % | WEIGHT: 163.6 LBS | HEART RATE: 69 BPM | BODY MASS INDEX: 21.58 KG/M2 | DIASTOLIC BLOOD PRESSURE: 73 MMHG | SYSTOLIC BLOOD PRESSURE: 124 MMHG

## 2025-07-14 DIAGNOSIS — A53.9 SYPHILIS: ICD-10-CM

## 2025-07-14 PROCEDURE — 96372 THER/PROPH/DIAG INJ SC/IM: CPT | Performed by: NURSE PRACTITIONER

## 2025-07-14 PROCEDURE — 2500000004 HC RX 250 GENERAL PHARMACY W/ HCPCS (ALT 636 FOR OP/ED): Mod: JZ | Performed by: NURSE PRACTITIONER

## 2025-07-14 RX ADMIN — PENICILLIN G BENZATHINE 2.4 MILLION UNITS: 2400000 INJECTION, SUSPENSION INTRAMUSCULAR at 13:29

## 2025-07-14 ASSESSMENT — PAIN SCALES - GENERAL: PAINLEVEL_OUTOF10: 0-NO PAIN

## 2025-07-14 NOTE — PROGRESS NOTES
Patient here for bicillin injection #1.  Administered to left gluteal muscle without incident.  Tolerated injection.  Bag of condoms and lube given to patient.

## 2025-07-21 ENCOUNTER — DOCUMENTATION (OUTPATIENT)
Dept: IMMUNOLOGY | Facility: CLINIC | Age: 45
End: 2025-07-21

## 2025-07-21 ENCOUNTER — CLINICAL SUPPORT (OUTPATIENT)
Dept: IMMUNOLOGY | Facility: CLINIC | Age: 45
End: 2025-07-21
Payer: COMMERCIAL

## 2025-07-21 DIAGNOSIS — A53.9 SYPHILIS: ICD-10-CM

## 2025-07-21 DIAGNOSIS — Z21 HIV INFECTION, UNSPECIFIED SYMPTOM STATUS: ICD-10-CM

## 2025-07-21 PROCEDURE — 2500000004 HC RX 250 GENERAL PHARMACY W/ HCPCS (ALT 636 FOR OP/ED): Mod: JZ | Performed by: NURSE PRACTITIONER

## 2025-07-21 PROCEDURE — 96372 THER/PROPH/DIAG INJ SC/IM: CPT | Performed by: NURSE PRACTITIONER

## 2025-07-21 RX ADMIN — PENICILLIN G BENZATHINE 2.4 MILLION UNITS: 2400000 INJECTION, SUSPENSION INTRAMUSCULAR at 11:46

## 2025-07-21 NOTE — PROGRESS NOTES
Pt had 3 injection today. SW provided pt with one gas card for his appt today. Pt signed for gas card.   PLAN: Pt sees RN next week for last injection appt.  JOHN Phipps

## 2025-07-30 ENCOUNTER — CLINICAL SUPPORT (OUTPATIENT)
Dept: IMMUNOLOGY | Facility: CLINIC | Age: 45
End: 2025-07-30
Payer: COMMERCIAL

## 2025-07-30 ENCOUNTER — DOCUMENTATION (OUTPATIENT)
Dept: IMMUNOLOGY | Facility: CLINIC | Age: 45
End: 2025-07-30
Payer: COMMERCIAL

## 2025-07-30 DIAGNOSIS — A53.9 SYPHILIS: ICD-10-CM

## 2025-07-30 PROCEDURE — 2500000004 HC RX 250 GENERAL PHARMACY W/ HCPCS (ALT 636 FOR OP/ED): Mod: JZ | Performed by: NURSE PRACTITIONER

## 2025-07-30 PROCEDURE — 96372 THER/PROPH/DIAG INJ SC/IM: CPT | Performed by: NURSE PRACTITIONER

## 2025-07-30 RX ADMIN — PENICILLIN G BENZATHINE 2.4 MILLION UNITS: 2400000 INJECTION, SUSPENSION INTRAMUSCULAR at 10:45

## 2025-07-30 NOTE — PROGRESS NOTES
PT saw RN for last Bicil injection. SW gave pt on gas card for todays appt.   Chiquita Cox, JOHN LOPEZ

## 2025-08-14 DIAGNOSIS — Z21 HIV INFECTION, UNSPECIFIED SYMPTOM STATUS: ICD-10-CM

## 2025-08-14 DIAGNOSIS — Z21 HIV INFECTION, UNSPECIFIED SYMPTOM STATUS: Primary | ICD-10-CM

## 2025-08-14 RX ORDER — BICTEGRAVIR SODIUM, EMTRICITABINE, AND TENOFOVIR ALAFENAMIDE FUMARATE 50; 200; 25 MG/1; MG/1; MG/1
1 TABLET ORAL DAILY
Qty: 30 TABLET | Refills: 5 | Status: SHIPPED | OUTPATIENT
Start: 2025-08-14

## 2025-09-11 ENCOUNTER — APPOINTMENT (OUTPATIENT)
Dept: OPHTHALMOLOGY | Facility: CLINIC | Age: 45
End: 2025-09-11
Payer: COMMERCIAL

## 2025-12-02 ENCOUNTER — APPOINTMENT (OUTPATIENT)
Dept: IMMUNOLOGY | Facility: CLINIC | Age: 45
End: 2025-12-02
Payer: COMMERCIAL